# Patient Record
Sex: FEMALE | Race: WHITE | Employment: FULL TIME | URBAN - METROPOLITAN AREA
[De-identification: names, ages, dates, MRNs, and addresses within clinical notes are randomized per-mention and may not be internally consistent; named-entity substitution may affect disease eponyms.]

---

## 2017-04-25 ENCOUNTER — OFFICE VISIT (OUTPATIENT)
Dept: INTERNAL MEDICINE CLINIC | Facility: CLINIC | Age: 57
End: 2017-04-25

## 2017-04-25 VITALS
OXYGEN SATURATION: 99 % | RESPIRATION RATE: 16 BRPM | HEIGHT: 64.25 IN | SYSTOLIC BLOOD PRESSURE: 104 MMHG | WEIGHT: 146.63 LBS | DIASTOLIC BLOOD PRESSURE: 66 MMHG | TEMPERATURE: 98 F | HEART RATE: 68 BPM | BODY MASS INDEX: 25.03 KG/M2

## 2017-04-25 DIAGNOSIS — Z12.4 ENCOUNTER FOR SCREENING FOR CERVICAL CANCER: ICD-10-CM

## 2017-04-25 DIAGNOSIS — Z00.00 ROUTINE GENERAL MEDICAL EXAMINATION AT A HEALTH CARE FACILITY: Primary | ICD-10-CM

## 2017-04-25 DIAGNOSIS — Z12.31 ENCOUNTER FOR SCREENING MAMMOGRAM FOR BREAST CANCER: ICD-10-CM

## 2017-04-25 PROCEDURE — 99396 PREV VISIT EST AGE 40-64: CPT | Performed by: INTERNAL MEDICINE

## 2017-04-25 PROCEDURE — 88175 CYTOPATH C/V AUTO FLUID REDO: CPT | Performed by: INTERNAL MEDICINE

## 2017-04-25 PROCEDURE — 87624 HPV HI-RISK TYP POOLED RSLT: CPT | Performed by: INTERNAL MEDICINE

## 2017-04-25 NOTE — PROGRESS NOTES
HPI:   [facility] Record exertion, no RODRIGUEZ or SOB  GI: denies abdominal pain,denies heartburn, change in bm's, bloody stools,   : denies dysuria, vaginal discharge or itching,  MUSCULOSKELETAL: denies back pain, new joint pain  NEURO: denies headaches, dizziness  PSYCHE: denies d encounter diagnosis)  Encounter for screening mammogram for breast cancer      Orders Placed This Encounter  CBC With Differential With Platelet  CMP  Lipid Panel  TSH    There are no Patient Instructions on file for this visit.     Meds & Refills for this

## 2017-05-31 ENCOUNTER — LAB ENCOUNTER (OUTPATIENT)
Dept: LAB | Age: 57
End: 2017-05-31
Attending: INTERNAL MEDICINE
Payer: COMMERCIAL

## 2017-05-31 ENCOUNTER — HOSPITAL ENCOUNTER (OUTPATIENT)
Dept: MAMMOGRAPHY | Age: 57
Discharge: HOME | End: 2017-05-31
Attending: INTERNAL MEDICINE
Payer: COMMERCIAL

## 2017-05-31 DIAGNOSIS — Z12.31 ENCOUNTER FOR SCREENING MAMMOGRAM FOR BREAST CANCER: ICD-10-CM

## 2017-05-31 DIAGNOSIS — Z00.00 ROUTINE GENERAL MEDICAL EXAMINATION AT A HEALTH CARE FACILITY: ICD-10-CM

## 2017-05-31 PROCEDURE — 77067 SCR MAMMO BI INCL CAD: CPT | Performed by: INTERNAL MEDICINE

## 2017-05-31 PROCEDURE — 36415 COLL VENOUS BLD VENIPUNCTURE: CPT | Performed by: INTERNAL MEDICINE

## 2017-06-13 ENCOUNTER — HOSPITAL ENCOUNTER (OUTPATIENT)
Dept: MAMMOGRAPHY | Facility: HOSPITAL | Age: 57
Discharge: HOME | End: 2017-06-13
Attending: INTERNAL MEDICINE
Payer: COMMERCIAL

## 2017-06-13 DIAGNOSIS — R92.8 ABNORMAL MAMMOGRAM OF LEFT BREAST: ICD-10-CM

## 2017-06-13 PROCEDURE — 77065 DX MAMMO INCL CAD UNI: CPT | Performed by: INTERNAL MEDICINE

## 2017-06-13 NOTE — PROGRESS NOTES
Quick Note:    Spoke to pt, aware of results and recommendations. Pt agreeable.  Order placed.  ______

## 2017-11-16 PROBLEM — M20.21 ACQUIRED HALLUX RIGIDUS, RIGHT: Status: ACTIVE | Noted: 2017-11-16

## 2017-12-12 ENCOUNTER — ANESTHESIA (OUTPATIENT)
Dept: SURGERY | Facility: HOSPITAL | Age: 57
End: 2017-12-12
Payer: COMMERCIAL

## 2017-12-12 ENCOUNTER — APPOINTMENT (OUTPATIENT)
Dept: GENERAL RADIOLOGY | Facility: HOSPITAL | Age: 57
End: 2017-12-12
Attending: ORTHOPAEDIC SURGERY
Payer: COMMERCIAL

## 2017-12-12 ENCOUNTER — HOSPITAL ENCOUNTER (OUTPATIENT)
Facility: HOSPITAL | Age: 57
Setting detail: HOSPITAL OUTPATIENT SURGERY
Discharge: HOME | End: 2017-12-12
Attending: ORTHOPAEDIC SURGERY | Admitting: ORTHOPAEDIC SURGERY
Payer: COMMERCIAL

## 2017-12-12 ENCOUNTER — SURGERY (OUTPATIENT)
Age: 57
End: 2017-12-12

## 2017-12-12 ENCOUNTER — ANESTHESIA EVENT (OUTPATIENT)
Dept: SURGERY | Facility: HOSPITAL | Age: 57
End: 2017-12-12
Payer: COMMERCIAL

## 2017-12-12 VITALS
SYSTOLIC BLOOD PRESSURE: 129 MMHG | HEART RATE: 62 BPM | OXYGEN SATURATION: 100 % | WEIGHT: 143 LBS | HEIGHT: 64 IN | RESPIRATION RATE: 18 BRPM | TEMPERATURE: 98 F | DIASTOLIC BLOOD PRESSURE: 67 MMHG | BODY MASS INDEX: 24.41 KG/M2

## 2017-12-12 DIAGNOSIS — M20.21 ACQUIRED HALLUX RIGIDUS, RIGHT: Primary | ICD-10-CM

## 2017-12-12 PROCEDURE — 94010 BREATHING CAPACITY TEST: CPT | Performed by: ORTHOPAEDIC SURGERY

## 2017-12-12 PROCEDURE — 0SRM0JZ REPLACEMENT OF RIGHT METATARSAL-PHALANGEAL JOINT WITH SYNTHETIC SUBSTITUTE, OPEN APPROACH: ICD-10-PCS | Performed by: ORTHOPAEDIC SURGERY

## 2017-12-12 PROCEDURE — 0SNM0ZZ RELEASE RIGHT METATARSAL-PHALANGEAL JOINT, OPEN APPROACH: ICD-10-PCS | Performed by: ORTHOPAEDIC SURGERY

## 2017-12-12 PROCEDURE — 73620 X-RAY EXAM OF FOOT: CPT | Performed by: ORTHOPAEDIC SURGERY

## 2017-12-12 PROCEDURE — 76001 XR C-ARM FLUORO >1 HOUR  (CPT=76001): CPT | Performed by: ORTHOPAEDIC SURGERY

## 2017-12-12 RX ORDER — ONDANSETRON 2 MG/ML
4 INJECTION INTRAMUSCULAR; INTRAVENOUS EVERY 6 HOURS PRN
Status: DISCONTINUED | OUTPATIENT
Start: 2017-12-12 | End: 2017-12-12

## 2017-12-12 RX ORDER — HYDROMORPHONE HYDROCHLORIDE 1 MG/ML
0.2 INJECTION, SOLUTION INTRAMUSCULAR; INTRAVENOUS; SUBCUTANEOUS EVERY 5 MIN PRN
Status: DISCONTINUED | OUTPATIENT
Start: 2017-12-12 | End: 2017-12-12

## 2017-12-12 RX ORDER — HYDROCODONE BITARTRATE AND ACETAMINOPHEN 5; 325 MG/1; MG/1
1 TABLET ORAL AS NEEDED
Status: DISCONTINUED | OUTPATIENT
Start: 2017-12-12 | End: 2017-12-12

## 2017-12-12 RX ORDER — MORPHINE SULFATE 2 MG/ML
2 INJECTION, SOLUTION INTRAMUSCULAR; INTRAVENOUS EVERY 2 HOUR PRN
Status: DISCONTINUED | OUTPATIENT
Start: 2017-12-12 | End: 2017-12-12

## 2017-12-12 RX ORDER — KETOROLAC TROMETHAMINE 15 MG/ML
15 INJECTION, SOLUTION INTRAMUSCULAR; INTRAVENOUS EVERY 6 HOURS PRN
Status: DISCONTINUED | OUTPATIENT
Start: 2017-12-12 | End: 2017-12-12

## 2017-12-12 RX ORDER — ACETAMINOPHEN 500 MG
1000 TABLET ORAL ONCE
Status: COMPLETED | OUTPATIENT
Start: 2017-12-12 | End: 2017-12-12

## 2017-12-12 RX ORDER — CEFAZOLIN SODIUM/WATER 2 G/20 ML
2 SYRINGE (ML) INTRAVENOUS ONCE
Status: COMPLETED | OUTPATIENT
Start: 2017-12-12 | End: 2017-12-12

## 2017-12-12 RX ORDER — MORPHINE SULFATE 2 MG/ML
2 INJECTION, SOLUTION INTRAMUSCULAR; INTRAVENOUS EVERY 10 MIN PRN
Status: DISCONTINUED | OUTPATIENT
Start: 2017-12-12 | End: 2017-12-12

## 2017-12-12 RX ORDER — HYDROCODONE BITARTRATE AND ACETAMINOPHEN 10; 325 MG/1; MG/1
1 TABLET ORAL EVERY 4 HOURS PRN
Status: DISCONTINUED | OUTPATIENT
Start: 2017-12-12 | End: 2017-12-12

## 2017-12-12 RX ORDER — HYDROMORPHONE HYDROCHLORIDE 1 MG/ML
0.4 INJECTION, SOLUTION INTRAMUSCULAR; INTRAVENOUS; SUBCUTANEOUS EVERY 5 MIN PRN
Status: DISCONTINUED | OUTPATIENT
Start: 2017-12-12 | End: 2017-12-12

## 2017-12-12 RX ORDER — KETOROLAC TROMETHAMINE 30 MG/ML
30 INJECTION, SOLUTION INTRAMUSCULAR; INTRAVENOUS EVERY 6 HOURS PRN
Status: DISCONTINUED | OUTPATIENT
Start: 2017-12-12 | End: 2017-12-12

## 2017-12-12 RX ORDER — MORPHINE SULFATE 2 MG/ML
1 INJECTION, SOLUTION INTRAMUSCULAR; INTRAVENOUS EVERY 2 HOUR PRN
Status: DISCONTINUED | OUTPATIENT
Start: 2017-12-12 | End: 2017-12-12

## 2017-12-12 RX ORDER — LIDOCAINE HYDROCHLORIDE 10 MG/ML
INJECTION, SOLUTION EPIDURAL; INFILTRATION; INTRACAUDAL; PERINEURAL AS NEEDED
Status: DISCONTINUED | OUTPATIENT
Start: 2017-12-12 | End: 2017-12-12 | Stop reason: SURG

## 2017-12-12 RX ORDER — HYDROMORPHONE HYDROCHLORIDE 1 MG/ML
0.6 INJECTION, SOLUTION INTRAMUSCULAR; INTRAVENOUS; SUBCUTANEOUS EVERY 5 MIN PRN
Status: DISCONTINUED | OUTPATIENT
Start: 2017-12-12 | End: 2017-12-12

## 2017-12-12 RX ORDER — METOCLOPRAMIDE 10 MG/1
10 TABLET ORAL ONCE
Status: DISCONTINUED | OUTPATIENT
Start: 2017-12-12 | End: 2017-12-12 | Stop reason: HOSPADM

## 2017-12-12 RX ORDER — NALOXONE HYDROCHLORIDE 0.4 MG/ML
80 INJECTION, SOLUTION INTRAMUSCULAR; INTRAVENOUS; SUBCUTANEOUS AS NEEDED
Status: DISCONTINUED | OUTPATIENT
Start: 2017-12-12 | End: 2017-12-12

## 2017-12-12 RX ORDER — HALOPERIDOL 5 MG/ML
0.25 INJECTION INTRAMUSCULAR ONCE AS NEEDED
Status: DISCONTINUED | OUTPATIENT
Start: 2017-12-12 | End: 2017-12-12

## 2017-12-12 RX ORDER — BUPIVACAINE HYDROCHLORIDE 2.5 MG/ML
INJECTION, SOLUTION EPIDURAL; INFILTRATION; INTRACAUDAL AS NEEDED
Status: DISCONTINUED | OUTPATIENT
Start: 2017-12-12 | End: 2017-12-12

## 2017-12-12 RX ORDER — ONDANSETRON 2 MG/ML
4 INJECTION INTRAMUSCULAR; INTRAVENOUS ONCE AS NEEDED
Status: COMPLETED | OUTPATIENT
Start: 2017-12-12 | End: 2017-12-12

## 2017-12-12 RX ORDER — MORPHINE SULFATE 10 MG/ML
6 INJECTION, SOLUTION INTRAMUSCULAR; INTRAVENOUS EVERY 10 MIN PRN
Status: DISCONTINUED | OUTPATIENT
Start: 2017-12-12 | End: 2017-12-12

## 2017-12-12 RX ORDER — DEXAMETHASONE SODIUM PHOSPHATE 4 MG/ML
VIAL (ML) INJECTION AS NEEDED
Status: DISCONTINUED | OUTPATIENT
Start: 2017-12-12 | End: 2017-12-12 | Stop reason: SURG

## 2017-12-12 RX ORDER — FAMOTIDINE 20 MG/1
20 TABLET ORAL ONCE
Status: DISCONTINUED | OUTPATIENT
Start: 2017-12-12 | End: 2017-12-12 | Stop reason: HOSPADM

## 2017-12-12 RX ORDER — SODIUM CHLORIDE, SODIUM LACTATE, POTASSIUM CHLORIDE, CALCIUM CHLORIDE 600; 310; 30; 20 MG/100ML; MG/100ML; MG/100ML; MG/100ML
INJECTION, SOLUTION INTRAVENOUS CONTINUOUS
Status: DISCONTINUED | OUTPATIENT
Start: 2017-12-12 | End: 2017-12-12

## 2017-12-12 RX ORDER — MORPHINE SULFATE 4 MG/ML
4 INJECTION, SOLUTION INTRAMUSCULAR; INTRAVENOUS EVERY 10 MIN PRN
Status: DISCONTINUED | OUTPATIENT
Start: 2017-12-12 | End: 2017-12-12

## 2017-12-12 RX ORDER — ONDANSETRON 2 MG/ML
INJECTION INTRAMUSCULAR; INTRAVENOUS AS NEEDED
Status: DISCONTINUED | OUTPATIENT
Start: 2017-12-12 | End: 2017-12-12 | Stop reason: SURG

## 2017-12-12 RX ORDER — HYDROCODONE BITARTRATE AND ACETAMINOPHEN 5; 325 MG/1; MG/1
2 TABLET ORAL AS NEEDED
Status: DISCONTINUED | OUTPATIENT
Start: 2017-12-12 | End: 2017-12-12

## 2017-12-12 RX ORDER — MIDAZOLAM HYDROCHLORIDE 1 MG/ML
INJECTION INTRAMUSCULAR; INTRAVENOUS AS NEEDED
Status: DISCONTINUED | OUTPATIENT
Start: 2017-12-12 | End: 2017-12-12 | Stop reason: SURG

## 2017-12-12 RX ADMIN — LIDOCAINE HYDROCHLORIDE 50 MG: 10 INJECTION, SOLUTION EPIDURAL; INFILTRATION; INTRACAUDAL; PERINEURAL at 14:26:00

## 2017-12-12 RX ADMIN — CEFAZOLIN SODIUM/WATER 2 G: 2 G/20 ML SYRINGE (ML) INTRAVENOUS at 14:33:00

## 2017-12-12 RX ADMIN — ONDANSETRON 4 MG: 2 INJECTION INTRAMUSCULAR; INTRAVENOUS at 14:26:00

## 2017-12-12 RX ADMIN — DEXAMETHASONE SODIUM PHOSPHATE 4 MG: 4 MG/ML VIAL (ML) INJECTION at 14:26:00

## 2017-12-12 RX ADMIN — SODIUM CHLORIDE, SODIUM LACTATE, POTASSIUM CHLORIDE, CALCIUM CHLORIDE: 600; 310; 30; 20 INJECTION, SOLUTION INTRAVENOUS at 14:55:00

## 2017-12-12 RX ADMIN — MIDAZOLAM HYDROCHLORIDE 2 MG: 1 INJECTION INTRAMUSCULAR; INTRAVENOUS at 14:24:00

## 2017-12-12 NOTE — H&P
History & Physical Examination    Patient Name: Jn Mc  MRN: [id number]  CSN: [id number]  YOB: 1960    Diagnosis: right hallux rigidus    Present Illness: 60 y/o with chronic right great toe pain. Failed conservative care.       Pre (COMPAZINE) injection 5 mg 5 mg Intravenous Once PRN   haloperidol lactate (HALDOL) 5 MG/ML injection 0.25 mg 0.25 mg Intravenous Once PRN   Naloxone HCl (NARCAN) 0.4 MG/ML injection 80 mcg 80 mcg Intravenous PRN   HYDROcodone-acetaminophen (Dorothey Macie) 10325 and Other Disorders Associated Father    • Breast Cancer Paternal Cousin Female 54   • Thyroid Disorder Sister         Smoking status: Never Smoker    Smokeless tobacco: Never Used    Alcohol use Yes  6.0 - 8.4 oz/week    10 - 14 Glasses of wine per week

## 2017-12-12 NOTE — ANESTHESIA POSTPROCEDURE EVALUATION
Patient: Perla Mendoza    Procedure Summary     Date:  12/12/17 Room / Location:  [facility] OR  / [facility] OR    Anesthesia Start:  1418 Anesthesia Stop:      Procedure:  FOOT OSTEOTOMY (Right Foot) Diagnosis:  (right hallux rigidus)    Surgeon:  Stephane Henry

## 2017-12-12 NOTE — ANESTHESIA PREPROCEDURE EVALUATION
Anesthesia PreOp Note    HPI:     Dionisio Gongora is a 59year old female who presents for preoperative consultation requested by: Vanessa Garcia MD    Date of Surgery: 12/12/2017    Procedure(s):  FOOT OSTEOTOMY  Indication: right hallux rigidus (TRANSDERM-SCOP, 1.5 MG,) 1 MG/3DAYS Transdermal Patch 72 Hr Place 1 patch onto the skin every third day.  Disp: 1 patch Rfl: 0 12/12/2017 at 0900   HYDROcodone-acetaminophen (NORCO)  MG Oral Tab Take 1-2 tablets by mouth every 6 (six) hours as needed 12/12/17  1232   BP:  134/72   BP Location:  Right arm   Pulse:  71   Resp:  16   Temp:  (!) 97.4 °F (36.3 °C)   TempSrc:  Oral   SpO2:  96%   Weight: 65.8 kg (145 lb) 64.9 kg (143 lb)   Height: 1.626 m (5' 4\")         Anesthesia ROS/Med Hx and Physical E

## 2017-12-13 NOTE — OPERATIVE REPORT
[facility]    PATIENT'S NAME: Luke Yaquelinjay   ATTENDING PHYSICIAN: Aracelis Vo MD   OPERATING PHYSICIAN: Aracelis Vo MD   PATIENT ACCOUNT#:   [id number]    LOCATION:  [address]  MEDICAL RECORD #:   [id number]       DATE OF We removed the prominent dorsal osteophyte of the first MTP joint. There was loss of articular cartilage from the first metatarsal head. We placed a guidewire until it was centered on AP and lateral views.   We then sized 10 mm as appropriate size implant

## 2018-05-29 PROBLEM — S42.452A CLOSED FRACTURE OF CAPITELLUM OF DISTAL HUMERUS, LEFT, INITIAL ENCOUNTER: Status: ACTIVE | Noted: 2018-05-29

## 2018-05-30 PROBLEM — M25.522 LEFT ELBOW PAIN: Status: ACTIVE | Noted: 2018-05-30

## 2018-05-30 PROBLEM — S42.452D: Status: ACTIVE | Noted: 2018-05-30

## 2018-07-19 PROBLEM — M20.21 ACQUIRED HALLUX RIGIDUS OF RIGHT FOOT: Status: ACTIVE | Noted: 2018-07-19

## 2018-07-23 ENCOUNTER — OFFICE VISIT (OUTPATIENT)
Dept: INTERNAL MEDICINE CLINIC | Facility: CLINIC | Age: 58
End: 2018-07-23
Payer: COMMERCIAL

## 2018-07-23 VITALS
SYSTOLIC BLOOD PRESSURE: 114 MMHG | HEIGHT: 63.5 IN | BODY MASS INDEX: 25.55 KG/M2 | WEIGHT: 146 LBS | DIASTOLIC BLOOD PRESSURE: 78 MMHG | OXYGEN SATURATION: 99 % | RESPIRATION RATE: 14 BRPM | HEART RATE: 61 BPM | TEMPERATURE: 98 F

## 2018-07-23 DIAGNOSIS — Z00.00 ROUTINE GENERAL MEDICAL EXAMINATION AT A HEALTH CARE FACILITY: ICD-10-CM

## 2018-07-23 DIAGNOSIS — R42 DIZZINESS: ICD-10-CM

## 2018-07-23 DIAGNOSIS — I34.1 MVP (MITRAL VALVE PROLAPSE): ICD-10-CM

## 2018-07-23 DIAGNOSIS — Z12.31 ENCOUNTER FOR SCREENING MAMMOGRAM FOR BREAST CANCER: Primary | ICD-10-CM

## 2018-07-23 PROCEDURE — 99396 PREV VISIT EST AGE 40-64: CPT | Performed by: INTERNAL MEDICINE

## 2018-07-23 RX ORDER — RUFINAMIDE 40 MG/ML
1 SUSPENSION ORAL DAILY
COMMUNITY

## 2018-07-23 NOTE — PROGRESS NOTES
HPI:   Wylie Riedel car fax. : in a committed relationship for 8 years . but lives separately Children: 2 adult, LMP:fenubia, 2013 , Exercise walks her dogs a lot     REVIEW OF SYSTEMS:   GENERAL: feels well otherwise  SKIN: denies any unusual skin lesions  EYES:denies tenderness  :introitus is normal,scant discharge,cervix is pink,no adnexal masses or tenderness,  RECTAL:good rectal tone, stool is OB negative no rectal masses  MUSCULOSKELETAL: back is not tender,FROM of the back  EXTREMITIES: no cyanosis, clubbing or

## 2018-07-26 ENCOUNTER — PATIENT MESSAGE (OUTPATIENT)
Dept: INTERNAL MEDICINE CLINIC | Facility: CLINIC | Age: 58
End: 2018-07-26

## 2018-07-26 NOTE — TELEPHONE ENCOUNTER
From: Crystal Mc  To: Diana Castillo MD  Sent: 7/26/2018 1:56 PM CDT  Subject: Non-Urgent Medical Question    I haven’t seen any orders to get my annual checkup follow up tests- i.e., mammogram, ekg, blood work.  Is it too soon or is there s

## 2018-08-13 ENCOUNTER — HOSPITAL ENCOUNTER (OUTPATIENT)
Dept: MAMMOGRAPHY | Age: 58
Discharge: HOME | End: 2018-08-13
Attending: INTERNAL MEDICINE
Payer: COMMERCIAL

## 2018-08-13 DIAGNOSIS — Z12.31 ENCOUNTER FOR SCREENING MAMMOGRAM FOR BREAST CANCER: ICD-10-CM

## 2018-08-13 PROCEDURE — 77067 SCR MAMMO BI INCL CAD: CPT | Performed by: INTERNAL MEDICINE

## 2018-08-13 PROCEDURE — 77063 BREAST TOMOSYNTHESIS BI: CPT | Performed by: INTERNAL MEDICINE

## 2018-08-23 ENCOUNTER — HOSPITAL ENCOUNTER (OUTPATIENT)
Dept: CV DIAGNOSTICS | Facility: HOSPITAL | Age: 58
Discharge: HOME | End: 2018-08-23
Attending: INTERNAL MEDICINE
Payer: COMMERCIAL

## 2018-08-23 ENCOUNTER — LAB ENCOUNTER (OUTPATIENT)
Dept: LAB | Age: 58
End: 2018-08-23
Attending: INTERNAL MEDICINE
Payer: COMMERCIAL

## 2018-08-23 DIAGNOSIS — Z00.00 ROUTINE GENERAL MEDICAL EXAMINATION AT A HEALTH CARE FACILITY: ICD-10-CM

## 2018-08-23 DIAGNOSIS — I34.1 MVP (MITRAL VALVE PROLAPSE): ICD-10-CM

## 2018-08-23 DIAGNOSIS — R42 DIZZINESS: ICD-10-CM

## 2018-08-23 LAB
ALBUMIN SERPL-MCNC: 3.9 G/DL (ref 3.5–4.8)
ALBUMIN/GLOB SERPL: 1.2 {RATIO} (ref 1–2)
ALP LIVER SERPL-CCNC: 53 U/L (ref 46–118)
ALT SERPL-CCNC: 26 U/L (ref 14–54)
ANION GAP SERPL CALC-SCNC: 6 MMOL/L (ref 0–18)
AST SERPL-CCNC: 15 U/L (ref 15–41)
BASOPHILS # BLD AUTO: 0.03 X10(3) UL (ref 0–0.1)
BASOPHILS NFR BLD AUTO: 0.5 %
BILIRUB SERPL-MCNC: 0.6 MG/DL (ref 0.1–2)
BUN BLD-MCNC: 12 MG/DL (ref 8–20)
BUN/CREAT SERPL: 18.2 (ref 10–20)
CALCIUM BLD-MCNC: 9.4 MG/DL (ref 8.3–10.3)
CHLORIDE SERPL-SCNC: 107 MMOL/L (ref 101–111)
CHOLEST SMN-MCNC: 182 MG/DL (ref ?–200)
CO2 SERPL-SCNC: 28 MMOL/L (ref 22–32)
CREAT BLD-MCNC: 0.66 MG/DL (ref 0.55–1.02)
EOSINOPHIL # BLD AUTO: 0.25 X10(3) UL (ref 0–0.3)
EOSINOPHIL NFR BLD AUTO: 4 %
ERYTHROCYTE [DISTWIDTH] IN BLOOD BY AUTOMATED COUNT: 13 % (ref 11.5–16)
GLOBULIN PLAS-MCNC: 3.3 G/DL (ref 2.5–4)
GLUCOSE BLD-MCNC: 78 MG/DL (ref 70–99)
HCT VFR BLD AUTO: 40.5 % (ref 34–50)
HDLC SERPL-MCNC: 69 MG/DL (ref 40–59)
HGB BLD-MCNC: 12.9 G/DL (ref 12–16)
IMMATURE GRANULOCYTE COUNT: 0.01 X10(3) UL (ref 0–1)
IMMATURE GRANULOCYTE RATIO %: 0.2 %
LDLC SERPL CALC-MCNC: 102 MG/DL (ref ?–100)
LYMPHOCYTES # BLD AUTO: 1.93 X10(3) UL (ref 0.9–4)
LYMPHOCYTES NFR BLD AUTO: 31.2 %
M PROTEIN MFR SERPL ELPH: 7.2 G/DL (ref 6.1–8.3)
MCH RBC QN AUTO: 30.6 PG (ref 27–33.2)
MCHC RBC AUTO-ENTMCNC: 31.9 G/DL (ref 31–37)
MCV RBC AUTO: 96 FL (ref 81–100)
MONOCYTES # BLD AUTO: 0.56 X10(3) UL (ref 0.1–1)
MONOCYTES NFR BLD AUTO: 9 %
NEUTROPHIL ABS PRELIM: 3.41 X10 (3) UL (ref 1.3–6.7)
NEUTROPHILS # BLD AUTO: 3.41 X10(3) UL (ref 1.3–6.7)
NEUTROPHILS NFR BLD AUTO: 55.1 %
NONHDLC SERPL-MCNC: 113 MG/DL (ref ?–130)
OSMOLALITY SERPL CALC.SUM OF ELEC: 291 MOSM/KG (ref 275–295)
PLATELET # BLD AUTO: 224 10(3)UL (ref 150–450)
POTASSIUM SERPL-SCNC: 4.1 MMOL/L (ref 3.6–5.1)
RBC # BLD AUTO: 4.22 X10(6)UL (ref 3.8–5.1)
RED CELL DISTRIBUTION WIDTH-SD: 45.5 FL (ref 35.1–46.3)
SODIUM SERPL-SCNC: 141 MMOL/L (ref 136–144)
TRIGL SERPL-MCNC: 53 MG/DL (ref 30–149)
TSI SER-ACNC: 3.15 MIU/ML (ref 0.35–5.5)
VLDLC SERPL CALC-MCNC: 11 MG/DL (ref 0–30)
WBC # BLD AUTO: 6.2 X10(3) UL (ref 4–13)

## 2018-08-23 PROCEDURE — 84443 ASSAY THYROID STIM HORMONE: CPT

## 2018-08-23 PROCEDURE — 80061 LIPID PANEL: CPT

## 2018-08-23 PROCEDURE — 36415 COLL VENOUS BLD VENIPUNCTURE: CPT

## 2018-08-23 PROCEDURE — 85025 COMPLETE CBC W/AUTO DIFF WBC: CPT

## 2018-08-23 PROCEDURE — 93306 TTE W/DOPPLER COMPLETE: CPT | Performed by: INTERNAL MEDICINE

## 2018-08-23 PROCEDURE — 80053 COMPREHEN METABOLIC PANEL: CPT

## 2018-12-27 ENCOUNTER — OFFICE VISIT (OUTPATIENT)
Dept: FAMILY MEDICINE CLINIC | Facility: CLINIC | Age: 58
End: 2018-12-27
Payer: COMMERCIAL

## 2018-12-27 VITALS
HEART RATE: 84 BPM | BODY MASS INDEX: 24.75 KG/M2 | WEIGHT: 145 LBS | TEMPERATURE: 97 F | HEIGHT: 64 IN | OXYGEN SATURATION: 98 % | DIASTOLIC BLOOD PRESSURE: 66 MMHG | SYSTOLIC BLOOD PRESSURE: 112 MMHG

## 2018-12-27 DIAGNOSIS — J06.9 UPPER RESPIRATORY TRACT INFECTION, UNSPECIFIED TYPE: Primary | ICD-10-CM

## 2018-12-27 DIAGNOSIS — R05.9 COUGH: ICD-10-CM

## 2018-12-27 PROCEDURE — 99213 OFFICE O/P EST LOW 20 MIN: CPT | Performed by: PHYSICIAN ASSISTANT

## 2018-12-27 RX ORDER — BENZONATATE 200 MG/1
200 CAPSULE ORAL 3 TIMES DAILY PRN
Qty: 21 CAPSULE | Refills: 0 | Status: SHIPPED | OUTPATIENT
Start: 2018-12-27 | End: 2019-01-03

## 2018-12-27 RX ORDER — METHYLPREDNISOLONE 4 MG/1
TABLET ORAL
Qty: 1 PACKAGE | Refills: 0 | Status: SHIPPED | OUTPATIENT
Start: 2018-12-27 | End: 2019-06-24

## 2018-12-27 NOTE — PROGRESS NOTES
CHIEF COMPLAINT:   Patient presents with:  Cough: cough is with phlegm and dry, keeping her up at night per pt, drainage, headache off and on, ear discomfort x 1 wk tried OTC and not helping       HPI:   Trevor Marcial is a 59year old female who presen 8/01   • Varicose veins 4/06   • Viral bronchitis 4/07      Past Surgical History:   Procedure Laterality Date   • CHOLECYSTECTOMY     • COLONOSCOPY  04/08/2015   • COLONOSCOPY     • COLPOSCOPY, CERVIX INC UPPER/ADJACENT VAGINA      LEEP,cryo   • FOOT OSTE mucosa pink, moist. Posterior pharynx is not erythematous. No exudates. Uvula midline. NECK: Supple, non-tender  LUNGS: clear to auscultation bilaterally, no wheezes or rhonchi. Breathing is non labored.   CARDIO: RRR without murmur  EXTREMITIES: no cyano

## 2018-12-27 NOTE — PATIENT INSTRUCTIONS
Patient Declined AVS    Verbal Instructions given      1. Medrol- No NSAIDs  2. Tessalon  3. OTC antihistamine  4. Increase fluids/ rest  5. Follow up with PCP  6.  Seek treatment if worsening symptoms

## 2019-04-24 ENCOUNTER — TELEPHONE (OUTPATIENT)
Dept: INTERNAL MEDICINE CLINIC | Facility: CLINIC | Age: 59
End: 2019-04-24

## 2019-04-24 NOTE — TELEPHONE ENCOUNTER
2nd shinrix received at [facility]. Immunization record updated.

## 2019-11-13 ENCOUNTER — TELEPHONE (OUTPATIENT)
Dept: INTERNAL MEDICINE CLINIC | Facility: CLINIC | Age: 59
End: 2019-11-13

## 2019-11-13 DIAGNOSIS — Z12.31 ENCOUNTER FOR SCREENING MAMMOGRAM FOR BREAST CANCER: Primary | ICD-10-CM

## 2019-11-13 NOTE — TELEPHONE ENCOUNTER
Patient scheduled her physical for 12/30/19. She is wondering if she can have an order for the mammogram prior to her appointment since the appointment is so late in the year. Please advise. Thank you!

## 2019-11-14 NOTE — TELEPHONE ENCOUNTER
PE 12/30/19. Pt requesting mammo order to complete before OV.   Bonita Cespedes pending for approval

## 2019-11-14 NOTE — TELEPHONE ENCOUNTER
Spoke to pt. Made aware of mammo order. Provided Central Scheduling number. Pt voiced understanding.

## 2019-12-06 ENCOUNTER — HOSPITAL ENCOUNTER (OUTPATIENT)
Dept: MAMMOGRAPHY | Age: 59
Discharge: HOME | End: 2019-12-06
Attending: INTERNAL MEDICINE
Payer: COMMERCIAL

## 2019-12-06 DIAGNOSIS — Z12.31 ENCOUNTER FOR SCREENING MAMMOGRAM FOR BREAST CANCER: ICD-10-CM

## 2019-12-06 PROCEDURE — 77067 SCR MAMMO BI INCL CAD: CPT | Performed by: INTERNAL MEDICINE

## 2019-12-06 PROCEDURE — 77063 BREAST TOMOSYNTHESIS BI: CPT | Performed by: INTERNAL MEDICINE

## 2019-12-13 ENCOUNTER — HOSPITAL ENCOUNTER (OUTPATIENT)
Age: 59
Discharge: HOME | End: 2019-12-13
Attending: FAMILY MEDICINE
Payer: COMMERCIAL

## 2019-12-13 VITALS
SYSTOLIC BLOOD PRESSURE: 138 MMHG | OXYGEN SATURATION: 97 % | DIASTOLIC BLOOD PRESSURE: 75 MMHG | RESPIRATION RATE: 16 BRPM | HEART RATE: 80 BPM | TEMPERATURE: 98 F | WEIGHT: 145 LBS | HEIGHT: 64 IN | BODY MASS INDEX: 24.75 KG/M2

## 2019-12-13 DIAGNOSIS — J06.9 VIRAL URI WITH COUGH: Primary | ICD-10-CM

## 2019-12-13 PROCEDURE — 99204 OFFICE O/P NEW MOD 45 MIN: CPT

## 2019-12-13 PROCEDURE — 99213 OFFICE O/P EST LOW 20 MIN: CPT

## 2019-12-13 RX ORDER — AZELASTINE 1 MG/ML
2 SPRAY, METERED NASAL 2 TIMES DAILY
Qty: 1 BOTTLE | Refills: 0 | Status: SHIPPED | OUTPATIENT
Start: 2019-12-13 | End: 2019-12-30 | Stop reason: ALTCHOICE

## 2019-12-13 RX ORDER — PREDNISONE 20 MG/1
40 TABLET ORAL DAILY
Qty: 10 TABLET | Refills: 0 | Status: SHIPPED | OUTPATIENT
Start: 2019-12-13 | End: 2019-12-18

## 2019-12-13 RX ORDER — CODEINE PHOSPHATE AND GUAIFENESIN 10; 100 MG/5ML; MG/5ML
5 SOLUTION ORAL NIGHTLY PRN
Qty: 50 ML | Refills: 0 | Status: SHIPPED | OUTPATIENT
Start: 2019-12-13 | End: 2019-12-20

## 2019-12-13 RX ORDER — BENZONATATE 100 MG/1
100 CAPSULE ORAL 3 TIMES DAILY PRN
Qty: 30 CAPSULE | Refills: 0 | Status: SHIPPED | OUTPATIENT
Start: 2019-12-13 | End: 2019-12-30 | Stop reason: ALTCHOICE

## 2019-12-13 NOTE — ED INITIAL ASSESSMENT (HPI)
Pt c/o sinus congestion for the past week. Pt states that she has lost her voice due to the congestion. Pt was just traveling from [place]. Pt c/o cough with mucous production.

## 2019-12-13 NOTE — ED PROVIDER NOTES
Patient Seen in: [facility]      History   Patient presents with:  Sinus Problem    Stated Complaint: losing voice, congestion, x5days     HPI    64-year-old female with a history of acute bronchitis and mitral valve prolap LEEP,cryo   • FOOT OSTEOTOMY Right 2017    Performed by Sandra Doty MD at [address] [place] [address] Dr   • FOOT SURGERY Left     artificial joint placement   • LAPAROSCOPIC CHOLECYSTECTOMY  05   •       x2   • OPEN REDUCTION INTERNAL FIXATION O Warm and dry  Neuro: A&O x3. No focal deficits  Psych: Normal affect      ED Course   Labs Reviewed - No data to display               MDM     58-year-old female with 5 days of URI-like symptoms. Symptoms most likely viral in nature.   Will start patient o

## 2019-12-20 ENCOUNTER — HOSPITAL ENCOUNTER (OUTPATIENT)
Age: 59
Discharge: HOME | End: 2019-12-20
Attending: FAMILY MEDICINE
Payer: COMMERCIAL

## 2019-12-20 VITALS
SYSTOLIC BLOOD PRESSURE: 124 MMHG | DIASTOLIC BLOOD PRESSURE: 74 MMHG | BODY MASS INDEX: 24.75 KG/M2 | RESPIRATION RATE: 16 BRPM | HEIGHT: 64 IN | WEIGHT: 145 LBS | OXYGEN SATURATION: 98 % | HEART RATE: 98 BPM

## 2019-12-20 DIAGNOSIS — J01.40 ACUTE NON-RECURRENT PANSINUSITIS: Primary | ICD-10-CM

## 2019-12-20 PROCEDURE — 99214 OFFICE O/P EST MOD 30 MIN: CPT

## 2019-12-20 PROCEDURE — 99213 OFFICE O/P EST LOW 20 MIN: CPT

## 2019-12-20 RX ORDER — DOXYCYCLINE HYCLATE 100 MG/1
100 CAPSULE ORAL 2 TIMES DAILY
Qty: 14 CAPSULE | Refills: 0 | Status: SHIPPED | OUTPATIENT
Start: 2019-12-20 | End: 2019-12-27

## 2019-12-20 RX ORDER — CODEINE PHOSPHATE AND GUAIFENESIN 10; 100 MG/5ML; MG/5ML
5 SOLUTION ORAL NIGHTLY PRN
Qty: 50 ML | Refills: 0 | Status: SHIPPED | OUTPATIENT
Start: 2019-12-20 | End: 2019-12-30 | Stop reason: ALTCHOICE

## 2019-12-20 NOTE — ED PROVIDER NOTES
Patient Seen in: [facility]      History   Patient presents with:  Cough/URI    Stated Complaint: congestion / ear ache / chronic cough x13 days    HPI    58-year-old female returns the IC secondary to her symptoms not impr Performed by Dottie Lambert MD at [facility] OR   • FOOT SURGERY Left     artificial joint placement   • LAPAROSCOPIC CHOLECYSTECTOMY  05   •       x2   • OPEN REDUCTION INTERNAL FIXATION OF CORONOID (ELBOW) Left 2018    Performed by Alfred deficits  Psych: Normal affect      ED Course   Labs Reviewed - No data to display               MDM     57-year-old female with 13 days of sinus pain and pressure, ear issues, and cough.   Patient was treated with conservative/symptomatic treatment with a

## 2019-12-30 ENCOUNTER — OFFICE VISIT (OUTPATIENT)
Dept: INTERNAL MEDICINE CLINIC | Facility: CLINIC | Age: 59
End: 2019-12-30
Payer: COMMERCIAL

## 2019-12-30 VITALS
SYSTOLIC BLOOD PRESSURE: 122 MMHG | WEIGHT: 145.31 LBS | BODY MASS INDEX: 24.81 KG/M2 | DIASTOLIC BLOOD PRESSURE: 70 MMHG | HEIGHT: 64 IN | RESPIRATION RATE: 14 BRPM | OXYGEN SATURATION: 98 % | HEART RATE: 79 BPM

## 2019-12-30 DIAGNOSIS — Z12.31 BREAST CANCER SCREENING BY MAMMOGRAM: ICD-10-CM

## 2019-12-30 DIAGNOSIS — Z00.00 ROUTINE GENERAL MEDICAL EXAMINATION AT A HEALTH CARE FACILITY: Primary | ICD-10-CM

## 2019-12-30 PROBLEM — M25.522 LEFT ELBOW PAIN: Status: RESOLVED | Noted: 2018-05-30 | Resolved: 2019-12-30

## 2019-12-30 PROBLEM — S42.452D: Status: RESOLVED | Noted: 2018-05-30 | Resolved: 2019-12-30

## 2019-12-30 PROBLEM — S42.452A CLOSED FRACTURE OF CAPITELLUM OF DISTAL HUMERUS, LEFT, INITIAL ENCOUNTER: Status: RESOLVED | Noted: 2018-05-29 | Resolved: 2019-12-30

## 2019-12-30 PROBLEM — M20.21 ACQUIRED HALLUX RIGIDUS, RIGHT: Status: RESOLVED | Noted: 2017-11-16 | Resolved: 2019-12-30

## 2019-12-30 PROCEDURE — 99396 PREV VISIT EST AGE 40-64: CPT | Performed by: INTERNAL MEDICINE

## 2019-12-30 NOTE — PROGRESS NOTES
HPI:   Vandana Card vision  HEENT: denies nasal congestion, sinus pain or ST  LUNGS: denies shortness of breath with exertion  CARDIOVASCULAR: denies chest pain on exertion, no RODRIGUEZ or SOB.  She has MVP w/mild MR,  GI denies heartburn, change in bm's, bloody stools,   : denie understanding of these issues and agrees to the plan. The patient is asked to return for CPX in 1 year.     Encounter for screening mammogram for breast cancer  (primary encounter diagnosis)  Routine general medical examination at a health care facility

## 2021-03-22 ENCOUNTER — OFFICE VISIT (OUTPATIENT)
Dept: INTERNAL MEDICINE CLINIC | Facility: CLINIC | Age: 61
End: 2021-03-22
Payer: COMMERCIAL

## 2021-03-22 VITALS
HEIGHT: 63.94 IN | SYSTOLIC BLOOD PRESSURE: 124 MMHG | DIASTOLIC BLOOD PRESSURE: 82 MMHG | RESPIRATION RATE: 16 BRPM | WEIGHT: 141 LBS | OXYGEN SATURATION: 97 % | HEART RATE: 70 BPM | TEMPERATURE: 98 F | BODY MASS INDEX: 24.37 KG/M2

## 2021-03-22 DIAGNOSIS — Z12.31 BREAST CANCER SCREENING BY MAMMOGRAM: ICD-10-CM

## 2021-03-22 DIAGNOSIS — Z00.00 ROUTINE GENERAL MEDICAL EXAMINATION AT A HEALTH CARE FACILITY: Primary | ICD-10-CM

## 2021-03-22 DIAGNOSIS — I34.1 MVP (MITRAL VALVE PROLAPSE): ICD-10-CM

## 2021-03-22 PROBLEM — Z98.890 HISTORY OF LOOP ELECTRICAL EXCISION PROCEDURE (LEEP): Status: ACTIVE | Noted: 2021-03-22

## 2021-03-22 PROCEDURE — 3074F SYST BP LT 130 MM HG: CPT | Performed by: INTERNAL MEDICINE

## 2021-03-22 PROCEDURE — 3008F BODY MASS INDEX DOCD: CPT | Performed by: INTERNAL MEDICINE

## 2021-03-22 PROCEDURE — 99396 PREV VISIT EST AGE 40-64: CPT | Performed by: INTERNAL MEDICINE

## 2021-03-22 PROCEDURE — 3079F DIAST BP 80-89 MM HG: CPT | Performed by: INTERNAL MEDICINE

## 2021-03-22 NOTE — PROGRESS NOTES
HPI:   Cristy Benavidez works out since being home     REVIEW OF SYSTEMS:   GENERAL: feels well otherwise  ROS:  CONSTITUTIONAL: no night sweats, fevers, unexplained wt loss, r  SKIN no rashes, suspicious or changing lesions  EYESdenies visual change or disturbance, drainage or i without murmur or gallop, loud midsystolic click  GI: good BS's,no masses, HSM or tenderness  MUSCULOSKELETAL: back is not tender,FROM of the back.    EXTREMITIES: no cyanosis, clubbing or edema, no varicosities or stasis change  NEURO: Oriented times three

## 2021-03-31 ENCOUNTER — TELEPHONE (OUTPATIENT)
Dept: INTERNAL MEDICINE CLINIC | Facility: CLINIC | Age: 61
End: 2021-03-31

## 2021-03-31 DIAGNOSIS — Z00.00 ROUTINE GENERAL MEDICAL EXAMINATION AT A HEALTH CARE FACILITY: Primary | ICD-10-CM

## 2021-03-31 NOTE — TELEPHONE ENCOUNTER
Incoming (mail or fax):  Fax  Received from:  [employer]  Documentation given to:  Results [employer]

## 2021-03-31 NOTE — TELEPHONE ENCOUNTER
Received CBC, CMP, FLP, & TSH W/ REFLEX results. Updated Ext Result Console as able. To Dr. Nir Olvera for review.

## 2021-03-31 NOTE — ADDENDUM NOTE
[facility]: 3/31/2021 08:56 AM     Modules accepted: Orders

## 2021-04-01 ENCOUNTER — HOSPITAL ENCOUNTER (OUTPATIENT)
Dept: MAMMOGRAPHY | Age: 61
Discharge: HOME | End: 2021-04-01
Attending: INTERNAL MEDICINE
Payer: COMMERCIAL

## 2021-04-01 DIAGNOSIS — Z12.31 BREAST CANCER SCREENING BY MAMMOGRAM: ICD-10-CM

## 2021-04-01 PROCEDURE — 77063 BREAST TOMOSYNTHESIS BI: CPT | Performed by: INTERNAL MEDICINE

## 2021-04-01 PROCEDURE — 77067 SCR MAMMO BI INCL CAD: CPT | Performed by: INTERNAL MEDICINE

## 2021-04-01 NOTE — TELEPHONE ENCOUNTER
My chart message was sent to patient to repeat labs in 3 months and also advised to hold multivitamins that may contain biotin for 3-4 days prior. Orders were placed and copy is mailed to patient.     TAYLOR Tarango

## 2021-04-13 ENCOUNTER — MED REC SCAN ONLY (OUTPATIENT)
Dept: INTERNAL MEDICINE CLINIC | Facility: CLINIC | Age: 61
End: 2021-04-13

## 2021-07-16 ENCOUNTER — LAB ENCOUNTER (OUTPATIENT)
Dept: LAB | Age: 61
End: 2021-07-16
Attending: INTERNAL MEDICINE
Payer: COMMERCIAL

## 2021-07-16 DIAGNOSIS — Z00.00 ROUTINE GENERAL MEDICAL EXAMINATION AT A HEALTH CARE FACILITY: ICD-10-CM

## 2021-07-16 LAB — TSI SER-ACNC: 2.65 MIU/ML (ref 0.36–3.74)

## 2021-07-16 PROCEDURE — 84443 ASSAY THYROID STIM HORMONE: CPT

## 2021-07-16 PROCEDURE — 36415 COLL VENOUS BLD VENIPUNCTURE: CPT

## 2021-08-18 ENCOUNTER — HOSPITAL ENCOUNTER (OUTPATIENT)
Age: 61
Discharge: HOME | End: 2021-08-18
Payer: COMMERCIAL

## 2021-08-18 VITALS
RESPIRATION RATE: 16 BRPM | WEIGHT: 140 LBS | TEMPERATURE: 97 F | OXYGEN SATURATION: 98 % | SYSTOLIC BLOOD PRESSURE: 131 MMHG | HEIGHT: 64 IN | DIASTOLIC BLOOD PRESSURE: 92 MMHG | BODY MASS INDEX: 23.9 KG/M2 | HEART RATE: 76 BPM

## 2021-08-18 DIAGNOSIS — J06.9 VIRAL UPPER RESPIRATORY ILLNESS: Primary | ICD-10-CM

## 2021-08-18 DIAGNOSIS — J04.0 ACUTE LARYNGITIS: ICD-10-CM

## 2021-08-18 PROCEDURE — 99213 OFFICE O/P EST LOW 20 MIN: CPT | Performed by: PHYSICIAN ASSISTANT

## 2021-08-18 RX ORDER — CODEINE PHOSPHATE AND GUAIFENESIN 10; 100 MG/5ML; MG/5ML
5 SOLUTION ORAL EVERY 6 HOURS PRN
Qty: 118 ML | Refills: 0 | Status: SHIPPED | OUTPATIENT
Start: 2021-08-18

## 2021-08-18 RX ORDER — PREDNISONE 20 MG/1
40 TABLET ORAL DAILY
Qty: 10 TABLET | Refills: 0 | Status: SHIPPED | OUTPATIENT
Start: 2021-08-18 | End: 2021-08-23

## 2021-08-18 NOTE — ED PROVIDER NOTES
Patient Seen in: [facility] [address] [facility]      History   No chief complaint on file. Stated Complaint: Loss of Voice; Sore Throat    HPI/Subjective:   HPI    Very pleasant 58-year-old female.   She arrives to the immediate care for further evalu May 2018    Broken elbow repair   • REPAIR ROTATOR CUFF,ACUTE  3/03    right subacromial decompression   • TONSILLECTOMY     • UPPER ARM/ELBOW SURGERY UNLISTED      ORIF left elbow to repair rx of radial head                Social History    Tobacco Use with honey. Voice rest.    Push clear fluids. Vitamin C and zinc.  Tea with honey. Cheratussin is a potent cough suppressant which contains a narcotic coating. Do not drive on this medication.   Do not take additional Mucinex when taking this cough syru

## 2021-08-18 NOTE — ED INITIAL ASSESSMENT (HPI)
Cold like symptoms x 1 month. C/o post nasal drip, headache, chest congestion and cough with loss of voice. Denies fevers. Covid vaccinated. Tested negative for covid 7/27/21.

## 2022-07-08 ENCOUNTER — OFFICE VISIT (OUTPATIENT)
Dept: INTERNAL MEDICINE CLINIC | Facility: CLINIC | Age: 62
End: 2022-07-08
Payer: COMMERCIAL

## 2022-07-08 VITALS
TEMPERATURE: 97 F | RESPIRATION RATE: 16 BRPM | HEART RATE: 72 BPM | BODY MASS INDEX: 24.29 KG/M2 | WEIGHT: 144 LBS | HEIGHT: 64.5 IN | OXYGEN SATURATION: 98 % | DIASTOLIC BLOOD PRESSURE: 72 MMHG | SYSTOLIC BLOOD PRESSURE: 118 MMHG

## 2022-07-08 DIAGNOSIS — I34.1 MVP (MITRAL VALVE PROLAPSE): ICD-10-CM

## 2022-07-08 DIAGNOSIS — Z11.51 SCREENING FOR HUMAN PAPILLOMAVIRUS (HPV): ICD-10-CM

## 2022-07-08 DIAGNOSIS — Z12.4 ENCOUNTER FOR PAPANICOLAOU SMEAR FOR CERVICAL CANCER SCREENING: ICD-10-CM

## 2022-07-08 DIAGNOSIS — Z12.31 ENCOUNTER FOR SCREENING MAMMOGRAM FOR BREAST CANCER: ICD-10-CM

## 2022-07-08 DIAGNOSIS — Z00.00 ROUTINE GENERAL MEDICAL EXAMINATION AT A HEALTH CARE FACILITY: Primary | ICD-10-CM

## 2022-07-08 PROCEDURE — 3008F BODY MASS INDEX DOCD: CPT | Performed by: INTERNAL MEDICINE

## 2022-07-08 PROCEDURE — 3078F DIAST BP <80 MM HG: CPT | Performed by: INTERNAL MEDICINE

## 2022-07-08 PROCEDURE — 99396 PREV VISIT EST AGE 40-64: CPT | Performed by: INTERNAL MEDICINE

## 2022-07-08 PROCEDURE — 87624 HPV HI-RISK TYP POOLED RSLT: CPT | Performed by: INTERNAL MEDICINE

## 2022-07-08 PROCEDURE — 88175 CYTOPATH C/V AUTO FLUID REDO: CPT | Performed by: INTERNAL MEDICINE

## 2022-07-08 PROCEDURE — 3074F SYST BP LT 130 MM HG: CPT | Performed by: INTERNAL MEDICINE

## 2022-07-11 LAB — HPV I/H RISK 1 DNA SPEC QL NAA+PROBE: NEGATIVE

## 2022-07-15 ENCOUNTER — HOSPITAL ENCOUNTER (OUTPATIENT)
Dept: MAMMOGRAPHY | Age: 62
Discharge: HOME | End: 2022-07-15
Attending: INTERNAL MEDICINE
Payer: COMMERCIAL

## 2022-07-15 DIAGNOSIS — Z12.31 ENCOUNTER FOR SCREENING MAMMOGRAM FOR BREAST CANCER: ICD-10-CM

## 2022-07-15 PROCEDURE — 77067 SCR MAMMO BI INCL CAD: CPT | Performed by: INTERNAL MEDICINE

## 2022-07-15 PROCEDURE — 77063 BREAST TOMOSYNTHESIS BI: CPT | Performed by: INTERNAL MEDICINE

## 2022-07-19 ENCOUNTER — LAB ENCOUNTER (OUTPATIENT)
Dept: LAB | Age: 62
End: 2022-07-19
Attending: INTERNAL MEDICINE
Payer: COMMERCIAL

## 2022-07-19 DIAGNOSIS — Z00.00 ROUTINE GENERAL MEDICAL EXAMINATION AT A HEALTH CARE FACILITY: ICD-10-CM

## 2022-07-19 LAB
ALBUMIN SERPL-MCNC: 3.9 G/DL (ref 3.4–5)
ALBUMIN/GLOB SERPL: 1.2 {RATIO} (ref 1–2)
ALP LIVER SERPL-CCNC: 53 U/L
ALT SERPL-CCNC: 27 U/L
ANION GAP SERPL CALC-SCNC: 7 MMOL/L (ref 0–18)
AST SERPL-CCNC: 20 U/L (ref 15–37)
BASOPHILS # BLD AUTO: 0.03 X10(3) UL (ref 0–0.2)
BASOPHILS NFR BLD AUTO: 0.5 %
BILIRUB SERPL-MCNC: 0.7 MG/DL (ref 0.1–2)
BUN BLD-MCNC: 12 MG/DL (ref 7–18)
CALCIUM BLD-MCNC: 9.5 MG/DL (ref 8.5–10.1)
CHLORIDE SERPL-SCNC: 108 MMOL/L (ref 98–112)
CHOLEST SERPL-MCNC: 192 MG/DL (ref ?–200)
CO2 SERPL-SCNC: 27 MMOL/L (ref 21–32)
CREAT BLD-MCNC: 0.7 MG/DL
EOSINOPHIL # BLD AUTO: 0.27 X10(3) UL (ref 0–0.7)
EOSINOPHIL NFR BLD AUTO: 4.6 %
ERYTHROCYTE [DISTWIDTH] IN BLOOD BY AUTOMATED COUNT: 12.6 %
FASTING PATIENT LIPID ANSWER: YES
FASTING STATUS PATIENT QL REPORTED: YES
GLOBULIN PLAS-MCNC: 3.2 G/DL (ref 2.8–4.4)
GLUCOSE BLD-MCNC: 86 MG/DL (ref 70–99)
HCT VFR BLD AUTO: 41.6 %
HDLC SERPL-MCNC: 70 MG/DL (ref 40–59)
HGB BLD-MCNC: 13.2 G/DL
IMM GRANULOCYTES # BLD AUTO: 0.01 X10(3) UL (ref 0–1)
IMM GRANULOCYTES NFR BLD: 0.2 %
LDLC SERPL CALC-MCNC: 110 MG/DL (ref ?–100)
LYMPHOCYTES # BLD AUTO: 1.8 X10(3) UL (ref 1–4)
LYMPHOCYTES NFR BLD AUTO: 30.9 %
MCH RBC QN AUTO: 30.3 PG (ref 26–34)
MCHC RBC AUTO-ENTMCNC: 31.7 G/DL (ref 31–37)
MCV RBC AUTO: 95.6 FL
MONOCYTES # BLD AUTO: 0.44 X10(3) UL (ref 0.1–1)
MONOCYTES NFR BLD AUTO: 7.6 %
NEUTROPHILS # BLD AUTO: 3.27 X10 (3) UL (ref 1.5–7.7)
NEUTROPHILS # BLD AUTO: 3.27 X10(3) UL (ref 1.5–7.7)
NEUTROPHILS NFR BLD AUTO: 56.2 %
NONHDLC SERPL-MCNC: 122 MG/DL (ref ?–130)
OSMOLALITY SERPL CALC.SUM OF ELEC: 293 MOSM/KG (ref 275–295)
PLATELET # BLD AUTO: 240 10(3)UL (ref 150–450)
POTASSIUM SERPL-SCNC: 4.1 MMOL/L (ref 3.5–5.1)
PROT SERPL-MCNC: 7.1 G/DL (ref 6.4–8.2)
RBC # BLD AUTO: 4.35 X10(6)UL
SODIUM SERPL-SCNC: 142 MMOL/L (ref 136–145)
TRIGL SERPL-MCNC: 67 MG/DL (ref 30–149)
TSI SER-ACNC: 3.65 MIU/ML (ref 0.36–3.74)
VLDLC SERPL CALC-MCNC: 11 MG/DL (ref 0–30)
WBC # BLD AUTO: 5.8 X10(3) UL (ref 4–11)

## 2022-07-19 PROCEDURE — 84443 ASSAY THYROID STIM HORMONE: CPT

## 2022-07-19 PROCEDURE — 85025 COMPLETE CBC W/AUTO DIFF WBC: CPT

## 2022-07-19 PROCEDURE — 80053 COMPREHEN METABOLIC PANEL: CPT

## 2022-07-19 PROCEDURE — 36415 COLL VENOUS BLD VENIPUNCTURE: CPT

## 2022-07-19 PROCEDURE — 80061 LIPID PANEL: CPT

## 2022-07-29 ENCOUNTER — HOSPITAL ENCOUNTER (OUTPATIENT)
Dept: MAMMOGRAPHY | Facility: HOSPITAL | Age: 62
Discharge: HOME | End: 2022-07-29
Attending: INTERNAL MEDICINE
Payer: COMMERCIAL

## 2022-07-29 DIAGNOSIS — R92.2 INCONCLUSIVE MAMMOGRAM: ICD-10-CM

## 2022-07-29 PROCEDURE — 77065 DX MAMMO INCL CAD UNI: CPT | Performed by: INTERNAL MEDICINE

## 2022-07-29 PROCEDURE — 77061 BREAST TOMOSYNTHESIS UNI: CPT | Performed by: INTERNAL MEDICINE

## 2023-05-22 ENCOUNTER — HOSPITAL ENCOUNTER (OUTPATIENT)
Age: 63
Discharge: HOME | End: 2023-05-22
Payer: COMMERCIAL

## 2023-05-22 VITALS
DIASTOLIC BLOOD PRESSURE: 74 MMHG | HEART RATE: 83 BPM | BODY MASS INDEX: 23.9 KG/M2 | SYSTOLIC BLOOD PRESSURE: 124 MMHG | WEIGHT: 140 LBS | RESPIRATION RATE: 20 BRPM | OXYGEN SATURATION: 95 % | HEIGHT: 64 IN | TEMPERATURE: 98 F

## 2023-05-22 DIAGNOSIS — J40 BRONCHITIS: Primary | ICD-10-CM

## 2023-05-22 PROCEDURE — 99213 OFFICE O/P EST LOW 20 MIN: CPT | Performed by: NURSE PRACTITIONER

## 2023-05-22 RX ORDER — CODEINE PHOSPHATE AND GUAIFENESIN 10; 100 MG/5ML; MG/5ML
5 SOLUTION ORAL EVERY 6 HOURS PRN
Qty: 180 ML | Refills: 0 | Status: SHIPPED | OUTPATIENT
Start: 2023-05-22

## 2023-05-22 RX ORDER — BENZONATATE 100 MG/1
100 CAPSULE ORAL 3 TIMES DAILY PRN
Qty: 30 CAPSULE | Refills: 0 | Status: SHIPPED | OUTPATIENT
Start: 2023-05-22 | End: 2023-06-21

## 2023-05-22 RX ORDER — AZELASTINE 1 MG/ML
1 SPRAY, METERED NASAL 2 TIMES DAILY
Qty: 30 ML | Refills: 0 | Status: SHIPPED | OUTPATIENT
Start: 2023-05-22

## 2023-05-22 NOTE — ED INITIAL ASSESSMENT (HPI)
Coughing x 1 week. Seems like getting worse - especially at noc. Temporary relief w OTC cough meds. Ear & sinus  Pressure. Denies fever or chills. Negative Covid PTA.

## 2023-05-22 NOTE — DISCHARGE INSTRUCTIONS
Drink plenty fluids  During the day, you may use Mucinex DM one tab every 12 hours    Follow-up with your family doctor in 2-3 days if no better  Return to ED for any worsening or persisting symptoms, shortness of breath, chest pain, dizziness, fever.

## 2023-08-21 ENCOUNTER — TELEPHONE (OUTPATIENT)
Dept: INTERNAL MEDICINE CLINIC | Facility: CLINIC | Age: 63
End: 2023-08-21

## 2023-09-08 ENCOUNTER — OFFICE VISIT (OUTPATIENT)
Dept: INTERNAL MEDICINE CLINIC | Facility: CLINIC | Age: 63
End: 2023-09-08
Payer: COMMERCIAL

## 2023-09-08 VITALS
BODY MASS INDEX: 24.62 KG/M2 | DIASTOLIC BLOOD PRESSURE: 68 MMHG | RESPIRATION RATE: 16 BRPM | HEIGHT: 64.5 IN | WEIGHT: 146 LBS | HEART RATE: 68 BPM | SYSTOLIC BLOOD PRESSURE: 120 MMHG

## 2023-09-08 DIAGNOSIS — Z78.0 POSTMENOPAUSAL: ICD-10-CM

## 2023-09-08 DIAGNOSIS — Z13.820 SCREENING FOR OSTEOPOROSIS: ICD-10-CM

## 2023-09-08 DIAGNOSIS — Z12.31 ENCOUNTER FOR SCREENING MAMMOGRAM FOR BREAST CANCER: ICD-10-CM

## 2023-09-08 DIAGNOSIS — Z12.83 SKIN CANCER SCREENING: ICD-10-CM

## 2023-09-08 DIAGNOSIS — Z13.29 SCREENING FOR THYROID DISORDER: ICD-10-CM

## 2023-09-08 DIAGNOSIS — Z13.220 SCREENING FOR LIPID DISORDERS: ICD-10-CM

## 2023-09-08 DIAGNOSIS — Z00.00 ENCOUNTER FOR ANNUAL PHYSICAL EXAM: Primary | ICD-10-CM

## 2023-09-08 DIAGNOSIS — Z23 NEED FOR VACCINATION: ICD-10-CM

## 2023-09-08 DIAGNOSIS — I34.1 MVP (MITRAL VALVE PROLAPSE): ICD-10-CM

## 2023-09-08 PROCEDURE — 90471 IMMUNIZATION ADMIN: CPT | Performed by: NURSE PRACTITIONER

## 2023-09-08 PROCEDURE — 90472 IMMUNIZATION ADMIN EACH ADD: CPT | Performed by: NURSE PRACTITIONER

## 2023-09-08 PROCEDURE — 90677 PCV20 VACCINE IM: CPT | Performed by: NURSE PRACTITIONER

## 2023-09-08 PROCEDURE — 90715 TDAP VACCINE 7 YRS/> IM: CPT | Performed by: NURSE PRACTITIONER

## 2023-09-08 PROCEDURE — 3074F SYST BP LT 130 MM HG: CPT | Performed by: NURSE PRACTITIONER

## 2023-09-08 PROCEDURE — 99397 PER PM REEVAL EST PAT 65+ YR: CPT | Performed by: NURSE PRACTITIONER

## 2023-09-08 PROCEDURE — 3008F BODY MASS INDEX DOCD: CPT | Performed by: NURSE PRACTITIONER

## 2023-09-08 PROCEDURE — 3078F DIAST BP <80 MM HG: CPT | Performed by: NURSE PRACTITIONER

## 2023-09-08 NOTE — PATIENT INSTRUCTIONS
Get your labs done. You should be fasting for at least 10 hours. If you take a multivitamin with Biotin or any biotin product it should be held for 3 days prior to getting your labs done. If you use [facility] labs, you may schedule at [phone] or on-line at [address] the mammogram and DEXA scan done. Get the rsv, flu, and covid booster when available at local pharmacy. Do not combine any vaccines with rsv vaccine and wait at least 2 weeks to get any other vaccines.

## 2023-09-15 ENCOUNTER — LAB ENCOUNTER (OUTPATIENT)
Dept: LAB | Age: 63
End: 2023-09-15
Attending: NURSE PRACTITIONER
Payer: COMMERCIAL

## 2023-09-15 DIAGNOSIS — Z13.29 SCREENING FOR THYROID DISORDER: ICD-10-CM

## 2023-09-15 DIAGNOSIS — Z00.00 ENCOUNTER FOR ANNUAL PHYSICAL EXAM: ICD-10-CM

## 2023-09-15 DIAGNOSIS — Z13.220 SCREENING FOR LIPID DISORDERS: ICD-10-CM

## 2023-09-15 LAB
ALBUMIN SERPL-MCNC: 4.1 G/DL (ref 3.4–5)
ALBUMIN/GLOB SERPL: 1.2 {RATIO} (ref 1–2)
ALP LIVER SERPL-CCNC: 58 U/L
ALT SERPL-CCNC: 28 U/L
ANION GAP SERPL CALC-SCNC: 8 MMOL/L (ref 0–18)
AST SERPL-CCNC: 20 U/L (ref 15–37)
BASOPHILS # BLD AUTO: 0.06 X10(3) UL (ref 0–0.2)
BASOPHILS NFR BLD AUTO: 0.8 %
BILIRUB SERPL-MCNC: 0.5 MG/DL (ref 0.1–2)
BUN BLD-MCNC: 15 MG/DL (ref 7–18)
CALCIUM BLD-MCNC: 9.5 MG/DL (ref 8.5–10.1)
CHLORIDE SERPL-SCNC: 106 MMOL/L (ref 98–112)
CHOLEST SERPL-MCNC: 197 MG/DL (ref ?–200)
CO2 SERPL-SCNC: 26 MMOL/L (ref 21–32)
CREAT BLD-MCNC: 0.71 MG/DL
EGFRCR SERPLBLD CKD-EPI 2021: 94 ML/MIN/1.73M2 (ref 60–?)
EOSINOPHIL # BLD AUTO: 0.29 X10(3) UL (ref 0–0.7)
EOSINOPHIL NFR BLD AUTO: 4 %
ERYTHROCYTE [DISTWIDTH] IN BLOOD BY AUTOMATED COUNT: 12.2 %
FASTING PATIENT LIPID ANSWER: YES
FASTING STATUS PATIENT QL REPORTED: YES
GLOBULIN PLAS-MCNC: 3.3 G/DL (ref 2.8–4.4)
GLUCOSE BLD-MCNC: 90 MG/DL (ref 70–99)
HCT VFR BLD AUTO: 40 %
HDLC SERPL-MCNC: 60 MG/DL (ref 40–59)
HGB BLD-MCNC: 12.7 G/DL
IMM GRANULOCYTES # BLD AUTO: 0.02 X10(3) UL (ref 0–1)
IMM GRANULOCYTES NFR BLD: 0.3 %
LDLC SERPL CALC-MCNC: 124 MG/DL (ref ?–100)
LYMPHOCYTES # BLD AUTO: 2.41 X10(3) UL (ref 1–4)
LYMPHOCYTES NFR BLD AUTO: 33.2 %
MCH RBC QN AUTO: 29.2 PG (ref 26–34)
MCHC RBC AUTO-ENTMCNC: 31.8 G/DL (ref 31–37)
MCV RBC AUTO: 92 FL
MONOCYTES # BLD AUTO: 0.67 X10(3) UL (ref 0.1–1)
MONOCYTES NFR BLD AUTO: 9.2 %
NEUTROPHILS # BLD AUTO: 3.81 X10 (3) UL (ref 1.5–7.7)
NEUTROPHILS # BLD AUTO: 3.81 X10(3) UL (ref 1.5–7.7)
NEUTROPHILS NFR BLD AUTO: 52.5 %
NONHDLC SERPL-MCNC: 137 MG/DL (ref ?–130)
OSMOLALITY SERPL CALC.SUM OF ELEC: 290 MOSM/KG (ref 275–295)
PLATELET # BLD AUTO: 235 10(3)UL (ref 150–450)
POTASSIUM SERPL-SCNC: 4 MMOL/L (ref 3.5–5.1)
PROT SERPL-MCNC: 7.4 G/DL (ref 6.4–8.2)
RBC # BLD AUTO: 4.35 X10(6)UL
SODIUM SERPL-SCNC: 140 MMOL/L (ref 136–145)
TRIGL SERPL-MCNC: 72 MG/DL (ref 30–149)
TSI SER-ACNC: 2.58 MIU/ML (ref 0.36–3.74)
VLDLC SERPL CALC-MCNC: 13 MG/DL (ref 0–30)
WBC # BLD AUTO: 7.3 X10(3) UL (ref 4–11)

## 2023-09-15 PROCEDURE — 84443 ASSAY THYROID STIM HORMONE: CPT

## 2023-09-15 PROCEDURE — 80061 LIPID PANEL: CPT

## 2023-09-15 PROCEDURE — 85025 COMPLETE CBC W/AUTO DIFF WBC: CPT

## 2023-09-15 PROCEDURE — 36415 COLL VENOUS BLD VENIPUNCTURE: CPT

## 2023-09-15 PROCEDURE — 80053 COMPREHEN METABOLIC PANEL: CPT

## 2023-09-29 ENCOUNTER — HOSPITAL ENCOUNTER (OUTPATIENT)
Dept: MAMMOGRAPHY | Age: 63
Discharge: HOME | End: 2023-09-29
Attending: NURSE PRACTITIONER
Payer: COMMERCIAL

## 2023-09-29 DIAGNOSIS — Z12.31 ENCOUNTER FOR SCREENING MAMMOGRAM FOR BREAST CANCER: ICD-10-CM

## 2023-09-29 PROCEDURE — 77067 SCR MAMMO BI INCL CAD: CPT | Performed by: NURSE PRACTITIONER

## 2023-09-29 PROCEDURE — 77063 BREAST TOMOSYNTHESIS BI: CPT | Performed by: NURSE PRACTITIONER

## 2023-10-18 ENCOUNTER — HOSPITAL ENCOUNTER (OUTPATIENT)
Dept: MAMMOGRAPHY | Facility: HOSPITAL | Age: 63
Discharge: HOME | End: 2023-10-18
Attending: NURSE PRACTITIONER
Payer: COMMERCIAL

## 2023-10-18 DIAGNOSIS — N63.20 BREAST MASS, LEFT: ICD-10-CM

## 2023-10-18 DIAGNOSIS — R92.2 INCONCLUSIVE MAMMOGRAM: ICD-10-CM

## 2023-10-18 DIAGNOSIS — R92.8 ABNORMAL MAMMOGRAM: ICD-10-CM

## 2023-10-18 PROCEDURE — 88344 IMHCHEM/IMCYTCHM EA MLT ANTB: CPT | Performed by: NURSE PRACTITIONER

## 2023-10-18 PROCEDURE — 77066 DX MAMMO INCL CAD BI: CPT | Performed by: NURSE PRACTITIONER

## 2023-10-18 PROCEDURE — 77062 BREAST TOMOSYNTHESIS BI: CPT | Performed by: NURSE PRACTITIONER

## 2023-10-18 PROCEDURE — 88305 TISSUE EXAM BY PATHOLOGIST: CPT | Performed by: NURSE PRACTITIONER

## 2023-10-18 PROCEDURE — 19083 BX BREAST 1ST LESION US IMAG: CPT | Performed by: NURSE PRACTITIONER

## 2023-10-18 PROCEDURE — 88342 IMHCHEM/IMCYTCHM 1ST ANTB: CPT | Performed by: NURSE PRACTITIONER

## 2023-10-18 PROCEDURE — 76642 ULTRASOUND BREAST LIMITED: CPT | Performed by: NURSE PRACTITIONER

## 2023-10-18 PROCEDURE — 88360 TUMOR IMMUNOHISTOCHEM/MANUAL: CPT | Performed by: NURSE PRACTITIONER

## 2023-10-18 PROCEDURE — 77065 DX MAMMO INCL CAD UNI: CPT | Performed by: NURSE PRACTITIONER

## 2023-10-18 NOTE — IMAGING NOTE
This Breast Care RN assisted Dr. Samy Millard with recommendation for a left breast 1 site ultrasound guided biopsy for mass. Procedure reviewed and all questions answered. Emotional and educational support given. On the day of the biopsy, pt instructed to take Tylenol 1000mg PO, eat a light meal & bring or wear a sports bra. Post biopsy care also reviewed with pt to include NO lifting more than 5lbs, no exercising or housework (limit upper body movement) for 24-48 hrs post biopsy. Patient denies blood thinners, bleeding disorders, liver disease, and chemo. Pt verbalized understanding. The patient has been scheduled for an appt today at 3 pm, arriving at 2:30.

## 2023-10-19 ENCOUNTER — TELEPHONE (OUTPATIENT)
Dept: INTERNAL MEDICINE CLINIC | Facility: CLINIC | Age: 63
End: 2023-10-19

## 2023-10-19 NOTE — TELEPHONE ENCOUNTER
Patient said she spoke with [facility] yesterday about her mammogram and biopsy she had. She should get the results of the biopsy next week. She is wondering if she can have a prescription for anxiety meds sent to the [address] [place], [place] [address] AT [address] [place] [address]. Does patient need an appt wince she spoke with [facility] yesterday? Please advise. Thank you!

## 2023-10-19 NOTE — TELEPHONE ENCOUNTER
Yes needs an appt. Can be a VV if she prefers. I only spoke to her about the biopsy results in process and we will inform her of results once released. There was no discussion of anxiety or anxiety treatment.

## 2023-10-20 ENCOUNTER — TELEPHONE (OUTPATIENT)
Dept: MAMMOGRAPHY | Facility: HOSPITAL | Age: 63
End: 2023-10-20

## 2023-10-20 NOTE — TELEPHONE ENCOUNTER
Called and spoke to patient. Patient accepted an appt with Dr Kofi Vasquez on Tues 10-31-23 at 9 am. Address provided. Appt confirmed with navigators. Patient verbalized understanding and has no further questions at this time.

## 2023-10-20 NOTE — TELEPHONE ENCOUNTER
Telephoned Julia Medeiros and name,  verified with patient. Notified Dirko Jarred of left breast positive for [address] biopsy result. Concordance pending. Rollo Conshohocken reports biopsy site is healing well. Radiologist recommends surgical consultation. [facility] office is referring patient to Dr Catherine Reed. Patient was provided with the contact information for Dr Catherine Reed, the Breast Program Navigators, and myself and informed that one of us will be calling her back with an appt date and time for Dr Catherine Reed. Pt verbalized understanding and had no further questions at this time.

## 2023-10-21 ENCOUNTER — PATIENT OUTREACH (OUTPATIENT)
Dept: INTERNAL MEDICINE CLINIC | Facility: CLINIC | Age: 63
End: 2023-10-21

## 2023-10-21 NOTE — PROGRESS NOTES
Patient was called and breast biopsy results were discussed in detail. Patient's questions were answered. She has an appointment with Dr. Ariana Morfin on 10/31/23. Video visit appt scheduled for 10/23/23 to discuss starting anxiety medication. Denies any SHIP.

## 2023-10-23 ENCOUNTER — TELEPHONE (OUTPATIENT)
Dept: HEMATOLOGY/ONCOLOGY | Facility: HOSPITAL | Age: 63
End: 2023-10-23

## 2023-10-23 NOTE — TELEPHONE ENCOUNTER
Left message for patient to call back. New cancer diagnosis, calling to explain role of navigator and reconfirm her appointment on 10/31.

## 2023-10-27 ENCOUNTER — OFFICE VISIT (OUTPATIENT)
Dept: SURGERY | Facility: CLINIC | Age: 63
End: 2023-10-27

## 2023-10-27 ENCOUNTER — NURSE NAVIGATOR ENCOUNTER (OUTPATIENT)
Dept: HEMATOLOGY/ONCOLOGY | Facility: HOSPITAL | Age: 63
End: 2023-10-27

## 2023-10-27 VITALS
RESPIRATION RATE: 17 BRPM | OXYGEN SATURATION: 97 % | TEMPERATURE: 97 F | HEART RATE: 77 BPM | HEIGHT: 64 IN | WEIGHT: 143 LBS | BODY MASS INDEX: 24.41 KG/M2 | SYSTOLIC BLOOD PRESSURE: 145 MMHG | DIASTOLIC BLOOD PRESSURE: 87 MMHG

## 2023-10-27 DIAGNOSIS — C50.412 INFILTRATING DUCTAL CARCINOMA OF UPPER-OUTER QUADRANT OF LEFT BREAST IN FEMALE (HCC): Primary | ICD-10-CM

## 2023-10-27 PROCEDURE — 3077F SYST BP >= 140 MM HG: CPT | Performed by: SURGERY

## 2023-10-27 PROCEDURE — 3008F BODY MASS INDEX DOCD: CPT | Performed by: SURGERY

## 2023-10-27 PROCEDURE — 3079F DIAST BP 80-89 MM HG: CPT | Performed by: SURGERY

## 2023-10-27 PROCEDURE — 99205 OFFICE O/P NEW HI 60 MIN: CPT | Performed by: SURGERY

## 2023-10-27 NOTE — PATIENT INSTRUCTIONS
Dr. Loni Bingham  Tel: [phone]  Fax: [address] [place] [address] [address] [facility], [address] [place] [address] Rd  [phone]     Surgery/Procedure: Left breast wire localized lumpectomy, left breast lymphoscintigraphy, left sentinel lymph node biopsy  FINAL SURGICAL SHEET PENDING MRI RESULTS     Anesthesia:   Gen  Surgery Length:   1.5 hours CPT:     Wire LOC:   Yes Nuc Med:   Yes Mamta Seed:  No       Dx & ICD-10:    Radiology Instructions:    _______________________________________________________________________________    Someone must accompany you the day of the procedure to drive you home safely, because of anesthesia. You will need an adult  to stay with you the first night following your surgery. You must remove any kind of makeup, acrylic nails, lotions, powders, creams or deodorant. [place] ONLY: Pre-admission will give instruct you on when to take Gatorade and Tylenol/acetaminophen prior to your surgery, purchase 2 - 12oz bottles of regular Gatorade (NOT RED/SUGAR FREE). Otherwise, you may not eat or drink anything else after 11PM the night before surgery. [place] ONLY: You may not eat or drink anything after midnight the day of your surgery. Wear comfortable clothing that can be easily removed. If you wear dentures, contacts lenses, or any prosthesis, you will be asked to remove them. Do not drink alcohol or smoke 24 hours prior to your procedure. Bring a picture ID and your insurance card. Covid-19 testing is no longer required before surgery unless you are experiencing symptoms such as fever, cough, congestion, etc.   The Pre-Admission Testing Department will call the day before to confirm your procedure, give you the time you need to arrive by and directions on where to go. They begin making calls after 2pm, if you are not contacted by 4pm, please call the surgeon's office listed above. Do not take any blood thinners at least one week prior to the procedure/surgery. This includes aspirin, baby aspirin, Ibuprofen products, herbal supplements, diet medications, vitamin E, fish oil and green tea supplements. Please check other supplements for these ingredients. *TYLENOL or acetaminophen is acceptable*  If you take Coumadin, Plavix, Xarelto, or Eliquis, please contact your prescribing physician for special instructions on how long to hold. If you take insulin contact your primary care physician for special instructions. Our surgery scheduler, Micah Tomlinson, will be contacting you to discuss surgery dates. If you have any questions related to scheduling your surgery, please reach out to her at [phone].  _____________________________________________________________________  PRE-OPERATIVE TESTING IF INDICATED BELOW  PLEASE COMPLETE ASAP (AT LEAST 14-21 DAYS PRIOR TO SURGERY)  [] CBC [] BMP [] CMP [] EKG    [] PT, PTT, INR [] Cardiac Clearance  [] H&P Medical Clearance [] Chest X-ray     Please call Central Scheduling to schedule an appointment for pre-operative labs/tests @ [phone]  Does the patient have a pacemaker or ICD? Does the patient have sleep apnea?   [] Yes   [x] No                               [] Yes   [x] No

## 2023-10-27 NOTE — PROGRESS NOTES
Met with patient in clinic. Introduced myself as one the breast nurse navigators and explained the role of the breast nurse navigator and coordination of care. Explained the role of all of the physicians involved in her care including the surgeon, medical oncologist, radiation oncologist, and possibly plastic surgeon. Reviewed over the patients pathology report and discussed receptors including ER/HI/ and Her 2. Patient was given the breast cancer treatment handbook and reviewed over how to use this resource. Discussed the breast multidisciplinary conference and that her case will be discussed. Patient was given the contact information for the social workers at the [facility] and resources for support as requested. Breast cancer journey map provided to patient. Provided lumpectomy surgical sheet. Next step in care will be to have MRI on 11/2 at 745pm, she is aware of the appointment. Pt was provided with breast nurse navigator contact information and was encouraged to phone with any other questions or concerns.

## 2023-11-05 ENCOUNTER — HOSPITAL ENCOUNTER (OUTPATIENT)
Dept: MRI IMAGING | Facility: HOSPITAL | Age: 63
Discharge: HOME | End: 2023-11-05
Attending: SURGERY
Payer: COMMERCIAL

## 2023-11-05 ENCOUNTER — HOSPITAL ENCOUNTER (OUTPATIENT)
Dept: MRI IMAGING | Facility: HOSPITAL | Age: 63
End: 2023-11-05
Attending: SURGERY
Payer: COMMERCIAL

## 2023-11-05 DIAGNOSIS — C50.412 INFILTRATING DUCTAL CARCINOMA OF UPPER-OUTER QUADRANT OF LEFT BREAST IN FEMALE (HCC): ICD-10-CM

## 2023-11-05 PROCEDURE — A9575 INJ GADOTERATE MEGLUMI 0.1ML: HCPCS | Performed by: SURGERY

## 2023-11-05 PROCEDURE — 77049 MRI BREAST C-+ W/CAD BI: CPT | Performed by: SURGERY

## 2023-11-05 RX ORDER — GADOTERATE MEGLUMINE 376.9 MG/ML
15 INJECTION INTRAVENOUS
Status: COMPLETED | OUTPATIENT
Start: 2023-11-05 | End: 2023-11-05

## 2023-11-05 RX ADMIN — GADOTERATE MEGLUMINE 14 ML: 376.9 INJECTION INTRAVENOUS at 13:36:00

## 2023-11-06 ENCOUNTER — NURSE NAVIGATOR ENCOUNTER (OUTPATIENT)
Dept: HEMATOLOGY/ONCOLOGY | Facility: HOSPITAL | Age: 63
End: 2023-11-06

## 2023-11-06 ENCOUNTER — TELEPHONE (OUTPATIENT)
Dept: SURGERY | Facility: CLINIC | Age: 63
End: 2023-11-06

## 2023-11-06 DIAGNOSIS — C50.919 INVASIVE LOBULAR CARCINOMA OF BREAST IN FEMALE (HCC): Primary | ICD-10-CM

## 2023-11-06 NOTE — PROGRESS NOTES
Patient called asking what the next step in her care is after receiving a note from her breast MRI results from Dr. Micky Rice. I stated that she will receive a phone call from, Dr. Beverly Marlow surgery scheduler and I will contact her to reach out to her to look for surgery dates. We discussed the sentinel lymph node and wire localization procedures and I answered her questions to the best of my ability. She thanked me for the assistance. Pt was provided with the breast nurse navigators contact information and was encouraged to phone with any other questions or concerns.

## 2023-11-06 NOTE — TELEPHONE ENCOUNTER
Calling pt in regards to scheduling surgery. Informed pt that I have 11/27/2023 available at [facility] with Dr. Jigar Fenton. Pt verbalized understanding and in agreement with date and location. All questions answered. Encouraged pt to call or Pin or Pegt message office with any other questions or concerns.

## 2023-11-13 DIAGNOSIS — C50.412 INFILTRATING DUCTAL CARCINOMA OF UPPER-OUTER QUADRANT OF LEFT BREAST IN FEMALE (HCC): Primary | ICD-10-CM

## 2023-11-16 ENCOUNTER — TELEPHONE (OUTPATIENT)
Dept: SURGERY | Facility: CLINIC | Age: 63
End: 2023-11-16

## 2023-11-16 DIAGNOSIS — C50.412 INFILTRATING DUCTAL CARCINOMA OF UPPER-OUTER QUADRANT OF LEFT BREAST IN FEMALE (HCC): Primary | ICD-10-CM

## 2023-11-16 NOTE — TELEPHONE ENCOUNTER
Patient  called and informed me she would like to cancel her surgery with Dr. Kofi Vasquez on 11/27/2023 she has decided to go with another provider

## 2023-11-21 ENCOUNTER — TELEPHONE (OUTPATIENT)
Dept: INTERNAL MEDICINE CLINIC | Facility: CLINIC | Age: 63
End: 2023-11-21

## 2023-11-21 DIAGNOSIS — F41.8 SITUATIONAL ANXIETY: ICD-10-CM

## 2023-11-21 NOTE — TELEPHONE ENCOUNTER
Rec call from patient. Patient picked up refill yesterday and requesting more refills. Advised to let us know closer to when new refill is low (2 weeks or after) for refill to be sent to pharmacy. Verbalizes understanding. No additional questions.

## 2023-11-21 NOTE — TELEPHONE ENCOUNTER
[facility] on 10.23.23. patient should have ample supply.

## 2023-11-21 NOTE — TELEPHONE ENCOUNTER
Is this medication prescribed by the [facility] Providers? yes    Did the patient contact the pharmacy directly?:  yes    Is patient out of meds or supply very low?:  completely out. No refills left. Medication Requested:  escitalopram 10 MG Oral Tab     Dose:      Is patient requesting a 30 or 90 day supply?:  30    Pharmacy name and phone # or location:    [place] [address] #[id number] - [facility], [address] [place] [address], [phone], [phone]       Is the patient due for an appointment?: no  (if so, please schedule appt)    Additional Notes:      Please advise the patient refills take up to 72 business hours.

## 2023-12-22 DIAGNOSIS — F41.8 SITUATIONAL ANXIETY: ICD-10-CM

## 2023-12-26 NOTE — TELEPHONE ENCOUNTER
Last OV relevant to medication: 10/23/23  Last refill date: 10/23/23 #30/refills: 1  When pt was asked to return for OV: as needed  Upcoming appt/reason:   Future Appointments   Date Time Provider Guillermina Lr   1/2/2024  9:00 AM Jocelyn Stubbs MD [facility] HEM ONC Rosalba Araujo   Was pt informed of any over due labs: utd    Pt did not want to be on long term, was advised to reach out for weaning instructions. Message sent to pt.

## 2023-12-27 RX ORDER — ESCITALOPRAM OXALATE 10 MG/1
10 TABLET ORAL DAILY
Qty: 90 TABLET | Refills: 0 | Status: SHIPPED | OUTPATIENT
Start: 2023-12-27

## 2023-12-27 NOTE — TELEPHONE ENCOUNTER
Please see pt response and OV notes 10/23/23. Pt would like to continue for another 60-90 days, pended for #90/0. Thanks!

## 2024-01-02 ENCOUNTER — OFFICE VISIT (OUTPATIENT)
Dept: HEMATOLOGY/ONCOLOGY | Facility: HOSPITAL | Age: 64
End: 2024-01-02
Attending: INTERNAL MEDICINE
Payer: COMMERCIAL

## 2024-01-02 ENCOUNTER — NURSE NAVIGATOR ENCOUNTER (OUTPATIENT)
Dept: HEMATOLOGY/ONCOLOGY | Facility: HOSPITAL | Age: 64
End: 2024-01-02

## 2024-01-02 VITALS
OXYGEN SATURATION: 99 % | WEIGHT: 142 LBS | TEMPERATURE: 98 F | SYSTOLIC BLOOD PRESSURE: 143 MMHG | RESPIRATION RATE: 16 BRPM | BODY MASS INDEX: 24.54 KG/M2 | DIASTOLIC BLOOD PRESSURE: 83 MMHG | HEART RATE: 75 BPM | HEIGHT: 63.9 IN

## 2024-01-02 DIAGNOSIS — Z78.0 ASYMPTOMATIC MENOPAUSE: ICD-10-CM

## 2024-01-02 DIAGNOSIS — M89.9 HUMERUS LESION, LEFT: ICD-10-CM

## 2024-01-02 DIAGNOSIS — Z17.0 MALIGNANT NEOPLASM OF UPPER-OUTER QUADRANT OF LEFT BREAST IN FEMALE, ESTROGEN RECEPTOR POSITIVE  (HCC): Primary | ICD-10-CM

## 2024-01-02 DIAGNOSIS — C50.412 MALIGNANT NEOPLASM OF UPPER-OUTER QUADRANT OF LEFT BREAST IN FEMALE, ESTROGEN RECEPTOR POSITIVE  (HCC): Primary | ICD-10-CM

## 2024-01-02 PROCEDURE — 99205 OFFICE O/P NEW HI 60 MIN: CPT | Performed by: INTERNAL MEDICINE

## 2024-01-02 NOTE — PROGRESS NOTES
Met with patient in clinic, she had Oncotype testing done at [facility].  Patient will share results with Vaughn when available.  Patient signed release and requested slides from [facility].  Patient will get called to schedule RT consult.

## 2024-01-02 NOTE — CONSULTS
[facility] Report of Consultation    Patient Name: Julia Medeiros   YOB: 1960   Medical Record Number: [id number]   CSN: [id number]   Consulting Physician: Scooter Stubbs MD  Referring Physician(s): No ref. provider found  Date of Consultation: 1/2/2024     Reason for Consultation:  Julia Medeiros was seen today in the Cancer Center for evaluation and management of left breast cancer.    History of Present Illness:     64 year old woman had a screening mammogram on 9/29/2023. This showed asymmetry in the superior breast tissue. She had a diagnostic mammogram and US on 10/18/2023. This showed a supsicious mass at the 1:30 o'clock left breast measuring 1.1 x 1.4 x 1.1 cm. She had an US biopsy on 10/18/2023 that showed invasive lobular carcinoma, grade II. The ER was > 95%, MT was 50%, Ki-67 was 22% and the Her2 was 1+ (negative). She had an MRI of the breast on 11/5/2023 that showed a 1.5 x 1.5 x 1.1 cm mass at the region of the biopsy in the left breast. There was no additional suspicious area on the MRI. She proceeded to have lumpectomy and SLN procedure at [facility] with Dr. Uzair Terry on 11/29/2023. This showed a 1.6 cm invasive lobular carcinoma, grade II. There was additional DCIS. The margins were all negative. She had a SLN procedure with 0/7 nodes. The repeat testing had ER %, MT negative, Ki-67 12% and Her2 2+ IHC but FISH not amplified.    She had a CXR on 12/29/2023 that was negative for suspicious lung finding but there was an indeterminate lucent focus in the left proximal humerus.     She has been doing well. She has minimal post op pain. She has no dyspnea or cough. She has no bone pain. She has no fever or sweats. Her energy level is good.    She has seen Dr. Cecil Long at [facility] for medical oncology consultation. The surgical specimen was sent for Oncotype Dx testing. This is pending.    Past Medical History:  Past Medical History:   Diagnosis Date    Cholecystitis with  cholelithiasis 2005    s/p lap teresa    SANTI I (cervical intraepithelial neoplasia I) 2001     s/p LEEP, cryo    Colles' fracture 2009    left wrist    Elbow injury     left, s/p ORIF of the radial head    MVP (mitral valve prolapse)     Tendinitis of right rotator cuff     possible tear s/p subacromial decompression    Varicose veins 2006       Past Surgical History:  Past Surgical History:   Procedure Laterality Date    CHOLECYSTECTOMY      COLONOSCOPY  2015    COLONOSCOPY      COLPOSCOPY, CERVIX INC UPPER/ADJACENT VAGINA      LEEP,cryo    FOOT SURGERY Left     artificial joint placement    LAPAROSCOPIC CHOLECYSTECTOMY  05          x2    OTHER SURGICAL HISTORY  May 2018    Broken elbow repair    REPAIR ROTATOR CUFF,ACUTE  3/03    right subacromial decompression    TONSILLECTOMY      UPPER ARM/ELBOW SURGERY UNLISTED      ORIF left elbow to repair rx of radial head       Family Medical History:  Family History   Problem Relation Age of Onset    Heart Disorder Mother         Cardiomyopathy    Alcohol and Other Disorders Associated Father     Thyroid Disorder Sister     Cancer Brother 65        Oral cancer    Breast Cancer Paternal Cousin Female 55       Gyne History:  OB History    Para Term  AB Living   2 2 0 0 0 0   SAB IAB Ectopic Multiple Live Births   0 0 0 0 0       Psychosocial History:  Social History     Socioeconomic History    Marital status:      Spouse name: Not on file    Number of children: Not on file    Years of education: Not on file    Highest education level: Not on file   Occupational History    Occupation:    Tobacco Use    Smoking status: Never    Smokeless tobacco: Never   Vaping Use    Vaping Use: Never used   Substance and Sexual Activity    Alcohol use: Not Currently     Comment: occasional    Drug use: No    Sexual activity: Not on file   Other Topics Concern     Service Not Asked    Blood  Transfusions Not Asked    Caffeine Concern Yes     Comment: green tea    Occupational Exposure Not Asked    Hobby Hazards Not Asked    Sleep Concern Not Asked    Stress Concern No    Weight Concern No    Special Diet No    Back Care Not Asked    Exercise Yes    Bike Helmet Not Asked    Seat Belt Yes    Self-Exams Not Asked   Social History Narrative    Not on file     Social Determinants of Health     Financial Resource Strain: Not on file   Food Insecurity: Not on file   Transportation Needs: Not on file   Physical Activity: Not on file   Stress: Not on file   Social Connections: Not on file   Housing Stability: Not on file       Allergies:   No Known Allergies    Current Medications:    Current Outpatient Medications:     ESCITALOPRAM 10 MG Oral Tab, TAKE 1 TABLET(10 MG) BY MOUTH DAILY, Disp: 90 tablet, Rfl: 0    Multivitamin Chewtab, ADULT, Oral Chew Tab, Chew 1 tablet by mouth daily., Disp: , Rfl:     LORazepam 0.5 MG Oral Tab, Take 1 tablet (0.5 mg total) by mouth nightly as needed for Anxiety. (Patient not taking: Reported on 1/2/2024), Disp: 15 tablet, Rfl: 0    Review of Systems:    Constitutional: Negative for anorexia, fatigue, fevers, chills, night sweats and weight loss.  Eyes: Negative for visual disturbance, irritation and redness.  Respiratory: Negative for cough, hemoptysis, chest pain, or dyspnea.  Cardiovascular: Negative for angina, orthopnea or palpitations.  Gastrointestinal: Negative for nausea, vomiting, change in bowel habits, diarrhea, constipation and abdominal pain.  Integument/breast: Negative for rash, skin lesions, and pruritus.  Hematologic/lymphatic: Negative for easy bruising, bleeding, and lymphadenopathy.  Musculoskeletal: Negative for myalgias, arthralgias, muscle weakness.  Genitourinary: Negative for dysuria or hematuria  Neurological: Negative for headaches, dizziness, speech problems, gait problems and focal weakness.  Psychiatric: The patient's mood was calm and appropriate  for this visit.    The pertinent positives and negatives were described in the HPI and above. All other systems were negative.      Vital Signs:  Height: 162.3 cm (5' 3.9\") (01/02 0903)  Weight: 64.4 kg (142 lb) (01/02 0903)  BSA (Calculated - sq m): 1.69 sq meters (01/02 0903)  Pulse: 75 (01/02 0903)  BP: 143/83 (01/02 0903)  Temp: 98 °F (36.7 °C) (01/02 0903)  Do Not Use - Resp Rate: --  SpO2: 99 % (01/02 0903)    Physical Examination:    Constitutional: Patient is alert and oriented x 3, not in acute distress.  HEENT:  Oropharynx is clear. Neck is supple.  Eyes: Anicteric sclera. Pink conjunctiva.  Respiratory: Clear to auscultation and percussion. No rales.  No wheezes.  Cardiovascular: Regular rate and rhythm.   Breast: The right breast has no mass or skin lesion. The left breast upper outer quadrant has a healing lumpectomy scar and healing axillary scar. There is no mass or other skin lesion.  Gastrointestinal: Soft, non tender with good bowel sounds.  Extremities: No edema. No calf tenderness.  Neurological: Grossly intact without focal motor or sensory deficit.  Lymphatics: There is no palpable lymphadenopathy throughout in the cervical, supraclavicular, or axillary regions.    Labs reviewed at this visit:  Lab Results   Component Value Date    WBC 7.3 09/15/2023    RBC 4.35 09/15/2023    HGB 12.7 09/15/2023    HCT 40.0 09/15/2023    MCV 92.0 09/15/2023    MCH 29.2 09/15/2023    MCHC 31.8 09/15/2023    RDW 12.2 09/15/2023    .0 09/15/2023     Lab Results   Component Value Date     09/15/2023    K 4.0 09/15/2023     09/15/2023    CO2 26.0 09/15/2023    BUN 15 09/15/2023    CREATSERUM 0.71 09/15/2023    GLU 90 09/15/2023    CA 9.5 09/15/2023    ALKPHO 58 09/15/2023    ALT 28 09/15/2023    AST 20 09/15/2023    BILT 0.5 09/15/2023    ALB 4.1 09/15/2023    TP 7.4 09/15/2023        Radiologic imaging reviewed at this visit:    CXR PA/Lat on 12/29/2023:  COMPARISON: None.     FINDINGS/    IMPRESSION:   Slightly diminished lung volumes with a few areas of minor probable subsegmental atelectasis and/or scar.  No discrete focal airspace consolidation or substantial pleural effusion, though consider follow-up or alternative imaging if concern persists.     Upper normal size cardiac silhouette, though probably accentuated by slightly diminished lung volumes.     Subcentimeter peripherally sclerotic lucent focus left proximal humerus is incompletely evaluated but of doubtful significance.  Scattered chest wall surgical clips.         Assessment/Plan:    Left breast cancer in the upper outer quadrant:  Lumpectomy and SLN procedure at  ([facility]) on 11/29/2023  ILC, grade II measuring 1.6 cm  SLN 0/7  ER %  MS negative  Ki-67 12%  Her2 2+ IHC  Her2 FISH not amplified    The patient has an indeterminate finding on the CXR in the left humerus. This is likely benign but we will get an MRI of the left proximal arm to assess this. Her risk of metastatic disease is low.    I agree with sending the pathology for Oncotype Dx testing. This has been sent on the finally lumpectomy pathology. I had a long discussion about this testing and how we would use this information. If the Oncotype Dx RS is 25 or less, then I would recommend against adjuvant chemotherapy. I would recommend proceeding with adjuvant endocrine therapy. I discussed the option of tamoxifen versus the aromatase inhibitors. I reviewed the side effects of tamoxifen such as thrombosis, uterine cancer, hot flashes. I discussed the risk of the AI's such as hot flashes, arthralgias and decreased bone density. I ordered a Bone Dexa scan that she will schedule.    If the Oncotype Dx RS is greater than 25 then we would consider adjuvant chemotherapy. I had a preliminary discussion about the Taxotere/cyclophosphamide regimen.     The patient will need radiation oncology consultation for left breast radiation discussion.     I will contact her with the  results of the left arm MRI and with the Oncotype Dx results. We will help her get a rad onc consultation.        Scooter Stubbs MD

## 2024-01-02 NOTE — PROGRESS NOTES
Patient is here for consultation for left breast cancer.  She had a routine mammogram with findings that indicated biopsy and this was done here.    She went to [facility] for her surgery.  She is feeling well postop.   She denies fever or shortness of breath.  She has a cough after a cold a few weeks ago.   She is generally active and eats well.      Education Record    Learner:  Patient and Spouse    Disease / Diagnosis: breast cancer     Barriers / Limitations:  None   Comments:    Method:  Discussion   Comments:    General Topics:  Side effects and symptom management   Comments:    Outcome:  Shows understanding   Comments:

## 2024-01-08 ENCOUNTER — NURSE NAVIGATOR ENCOUNTER (OUTPATIENT)
Dept: HEMATOLOGY/ONCOLOGY | Facility: HOSPITAL | Age: 64
End: 2024-01-08

## 2024-01-10 ENCOUNTER — TELEPHONE (OUTPATIENT)
Dept: HEMATOLOGY/ONCOLOGY | Facility: HOSPITAL | Age: 64
End: 2024-01-10

## 2024-01-10 NOTE — TELEPHONE ENCOUNTER
Called patient regarding Oncotype results.  Patient stated her results are 26, she will ask [facility] to fax the report to [place]. Patient states she does not want to do chemo unless it is absolutely necessary.  She has a consultation scheduled with [facility] onc tomorrow to discuss options.  Does want to meet with Dr. Stubbs as well.  Is already scheduled with radiation at [place].

## 2024-01-11 ENCOUNTER — OFFICE VISIT (OUTPATIENT)
Dept: HEMATOLOGY/ONCOLOGY | Age: 64
End: 2024-01-11
Attending: INTERNAL MEDICINE
Payer: COMMERCIAL

## 2024-01-11 VITALS
BODY MASS INDEX: 24.37 KG/M2 | HEART RATE: 73 BPM | RESPIRATION RATE: 18 BRPM | HEIGHT: 63.9 IN | OXYGEN SATURATION: 99 % | TEMPERATURE: 98 F | SYSTOLIC BLOOD PRESSURE: 138 MMHG | WEIGHT: 141 LBS | DIASTOLIC BLOOD PRESSURE: 79 MMHG

## 2024-01-11 DIAGNOSIS — C50.412 MALIGNANT NEOPLASM OF UPPER-OUTER QUADRANT OF LEFT BREAST IN FEMALE, ESTROGEN RECEPTOR POSITIVE  (HCC): Primary | ICD-10-CM

## 2024-01-11 DIAGNOSIS — Z17.0 MALIGNANT NEOPLASM OF UPPER-OUTER QUADRANT OF LEFT BREAST IN FEMALE, ESTROGEN RECEPTOR POSITIVE  (HCC): Primary | ICD-10-CM

## 2024-01-11 PROCEDURE — 99214 OFFICE O/P EST MOD 30 MIN: CPT | Performed by: INTERNAL MEDICINE

## 2024-01-11 NOTE — PROGRESS NOTES
Patient is here for follow up for breast cancer and to discuss her oncotype testing of 26.      Education Record    Learner:  Patient    Disease / Diagnosis: breast cancer    Barriers / Limitations:  None   Comments:    Method:  Discussion   Comments:    General Topics:  Side effects and symptom management   Comments:    Outcome:  Shows understanding   Comments:

## 2024-01-11 NOTE — PROGRESS NOTES
Cancer Center Progress Note    Problem List:      Patient Active Problem List   Diagnosis    Varicose veins    MVP (mitral valve prolapse)    Other specified congenital anomaly of skin    Acquired hallux rigidus of right foot    History of loop electrical excision procedure (LEEP)    Infiltrating ductal carcinoma of upper-outer quadrant of left breast in female (HCC)       Interim History:    Julia Medeiros presents today for evaluation and management of a diagnosis of left breast cancer.    The patient returns after getting the Oncotype Dx results. The recurrence score (RS) is 26. She has had follow up at [facility] with Dr. Cecil Long. He discussed adjuvant chemotherapy. She has no other new complaints. She has no pain.      Review of Systems:   Constitutional: Negative for anorexia, fatigue, fevers, chills, night sweats and weight loss.  Eyes: Negative for visual disturbance, irritation and redness.  Respiratory: Negative for cough, hemoptysis, chest pain, or dyspnea.  Cardiovascular: Negative for angina, orthopnea or palpitations.  Gastrointestinal: Negative for nausea, vomiting, change in bowel habits, diarrhea, constipation and abdominal pain.  Integument/breast: Negative for rash, skin lesions, and pruritus.  Hematologic/lymphatic: Negative for easy bruising, bleeding, and lymphadenopathy.  Musculoskeletal: Negative for myalgias, arthralgias, muscle weakness.  Genitourinary: Negative for dysuria or hematuria  Psychiatric: The patient's mood was calm and appropriate for this visit.  The pertinent positives and negatives were described. All other systems were negative.    PMH/PSH:  Past Medical History:   Diagnosis Date    Cholecystitis with cholelithiasis 07/14/2005    s/p lap teresa    SANTI I (cervical intraepithelial neoplasia I) 08/2001     s/p LEEP, cryo    Colles' fracture 02/2009    left wrist    Elbow injury     left, s/p ORIF of the radial head    MVP (mitral valve prolapse)     Tendinitis of right rotator  cuff 2003    possible tear s/p subacromial decompression    Varicose veins 2006       Past Surgical History:   Procedure Laterality Date    CHOLECYSTECTOMY      COLONOSCOPY  2015    COLONOSCOPY      COLPOSCOPY, CERVIX INC UPPER/ADJACENT VAGINA      LEEP,cryo    FOOT SURGERY Left     artificial joint placement    LAPAROSCOPIC CHOLECYSTECTOMY  05          x2    OTHER SURGICAL HISTORY  May 2018    Broken elbow repair    REPAIR ROTATOR CUFF,ACUTE  3/03    right subacromial decompression    TONSILLECTOMY      UPPER ARM/ELBOW SURGERY UNLISTED      ORIF left elbow to repair rx of radial head       Family History Reviewed:  Family History   Problem Relation Age of Onset    Heart Disorder Mother         Cardiomyopathy    Alcohol and Other Disorders Associated Father     Thyroid Disorder Sister     Cancer Brother 65        Oral cancer    Breast Cancer Paternal Cousin Female 55       Allergies:     No Known Allergies    Medications:   ESCITALOPRAM 10 MG Oral Tab TAKE 1 TABLET(10 MG) BY MOUTH DAILY 90 tablet 0    Multivitamin Chewtab, ADULT, Oral Chew Tab Chew 1 tablet by mouth daily.           Vital Signs:      Height: 162.3 cm (5' 3.9\") (1153)  Weight: 64 kg (141 lb) (1153)  BSA (Calculated - sq m): 1.68 sq meters (1153)  Pulse: 73 (1153)  BP: 138/79 (1153)  Temp: 97.6 °F (36.4 °C) (1153)  Do Not Use - Resp Rate: --  SpO2: 99 % (1153)      Physical Examination:    Constitutional: Patient is alert and oriented x 3, not in acute distress.  Psychiatric: The patient's mood is calm and appropriate for this visit.      Labs reviewed at this visit:     Lab Results   Component Value Date    WBC 7.3 09/15/2023    RBC 4.35 09/15/2023    HGB 12.7 09/15/2023    HCT 40.0 09/15/2023    MCV 92.0 09/15/2023    MCH 29.2 09/15/2023    MCHC 31.8 09/15/2023    RDW 12.2 09/15/2023    .0 09/15/2023     Lab Results   Component Value Date     09/15/2023    K 4.0 09/15/2023      09/15/2023    CO2 26.0 09/15/2023    BUN 15 09/15/2023    CREATSERUM 0.71 09/15/2023    GLU 90 09/15/2023    CA 9.5 09/15/2023    ALKPHO 58 09/15/2023    ALT 28 09/15/2023    AST 20 09/15/2023    BILT 0.5 09/15/2023    ALB 4.1 09/15/2023    TP 7.4 09/15/2023       Radiologic imaging reviewed at this visit:       Assessment/Plan:     Left breast cancer in the upper outer quadrant:  Lumpectomy and SLN procedure at  ([facility]) on 11/29/2023  ILC, grade II measuring 1.6 cm  SLN 0/7  ER %  MT negative  Ki-67 12%  Her2 2+ IHC  Her2 FISH not amplified  Oncotype Dx RS 26     I had a long discussion with the patient about the pathology results and the Oncotype Dx result. I discussed the retrospective data and the prospective data. The prospective randomized data only included patients with an Oncotype dx RU up to a score of 25 and did not show any benefit from the addition of adjuvant systemic chemotherapy. The retrospective data shows that the benefit from adjuvant chemotherapy increases with increasing Oncotype Dx RS. The retrospective data was indeterminate with scores between 26 to 30. She has a small, lymph node negative breast cancer. Clearly the absolute benefit of adjuvant chemotherapy would be very small if any but we cannot exclude that there could be a significant benefit. I reviewed the option of the Taxotere/cyclophosphamide regimen. After this full discussion she was favoring not having chemotherapy. I then discussed the option of endocrine therapy with Letrozole 2.5 mg daily. She understands the risks. We would want a Dexa scan as a baseline. She will think about her options and proceed with radiation. She will contact me if she wants to continue follow up with us or if she will continue with [facility] for her care.    This visit lasted 30 minutes with 25 minutes for face to face discussion of management and 5 minutes for post visit charting.      Scooter Stubbs MD

## 2024-01-16 ENCOUNTER — APPOINTMENT (OUTPATIENT)
Dept: RADIATION ONCOLOGY | Facility: HOSPITAL | Age: 64
End: 2024-01-16
Attending: RADIOLOGY
Payer: COMMERCIAL

## 2024-01-16 ENCOUNTER — OFFICE VISIT (OUTPATIENT)
Dept: INTERNAL MEDICINE CLINIC | Facility: CLINIC | Age: 64
End: 2024-01-16
Payer: COMMERCIAL

## 2024-01-16 VITALS
BODY MASS INDEX: 24.14 KG/M2 | TEMPERATURE: 95 F | OXYGEN SATURATION: 93 % | HEART RATE: 71 BPM | SYSTOLIC BLOOD PRESSURE: 122 MMHG | WEIGHT: 141.38 LBS | DIASTOLIC BLOOD PRESSURE: 72 MMHG | HEIGHT: 64 IN

## 2024-01-16 DIAGNOSIS — F99 INSOMNIA DUE TO OTHER MENTAL DISORDER: ICD-10-CM

## 2024-01-16 DIAGNOSIS — J32.9 BACTERIAL SINUSITIS: Primary | ICD-10-CM

## 2024-01-16 DIAGNOSIS — F51.05 INSOMNIA DUE TO OTHER MENTAL DISORDER: ICD-10-CM

## 2024-01-16 DIAGNOSIS — B96.89 BACTERIAL SINUSITIS: Primary | ICD-10-CM

## 2024-01-16 DIAGNOSIS — F41.8 SITUATIONAL ANXIETY: ICD-10-CM

## 2024-01-16 PROCEDURE — 3074F SYST BP LT 130 MM HG: CPT | Performed by: STUDENT IN AN ORGANIZED HEALTH CARE EDUCATION/TRAINING PROGRAM

## 2024-01-16 PROCEDURE — 99214 OFFICE O/P EST MOD 30 MIN: CPT | Performed by: STUDENT IN AN ORGANIZED HEALTH CARE EDUCATION/TRAINING PROGRAM

## 2024-01-16 PROCEDURE — 3078F DIAST BP <80 MM HG: CPT | Performed by: STUDENT IN AN ORGANIZED HEALTH CARE EDUCATION/TRAINING PROGRAM

## 2024-01-16 PROCEDURE — 3008F BODY MASS INDEX DOCD: CPT | Performed by: STUDENT IN AN ORGANIZED HEALTH CARE EDUCATION/TRAINING PROGRAM

## 2024-01-16 RX ORDER — AMOXICILLIN AND CLAVULANATE POTASSIUM 875; 125 MG/1; MG/1
1 TABLET, FILM COATED ORAL 2 TIMES DAILY
Qty: 10 TABLET | Refills: 0 | Status: SHIPPED | OUTPATIENT
Start: 2024-01-16 | End: 2024-01-21

## 2024-01-16 RX ORDER — MULTIVIT-MIN/IRON/FOLIC ACID/K 18-600-40
1 CAPSULE ORAL DAILY
COMMUNITY

## 2024-01-16 RX ORDER — METHYLPREDNISOLONE 4 MG/1
TABLET ORAL
Qty: 1 EACH | Refills: 0 | Status: SHIPPED | OUTPATIENT
Start: 2024-01-16

## 2024-01-16 RX ORDER — ESCITALOPRAM OXALATE 10 MG/1
10 TABLET ORAL DAILY
Qty: 90 TABLET | Refills: 1 | Status: SHIPPED | OUTPATIENT
Start: 2024-01-16

## 2024-01-16 NOTE — PROGRESS NOTES
Chief Complaint   Patient presents with    Cold     Upper over a month now headaches, coughing ,fatigue   1/15/24 neg for covid      Nasal Congestion     Increase Chest congestion used a nasal spray 1/15/24 helped a little      SUBJECTIVE & A/P:  The patient is a 64 year old F with pmhx of MVP, L ductal  breast cancer s/p lumpectomy, negative sentinal nodes who presents with cough, congestion.     //Bacterial sinusitis   Has had symptoms of cough for last 5 weeks. Now reporting last few days more productive cough with yellow sputum, congestion, and runny nose. Does not have chronic cough at baseline. Does report getting bad coughs after URIs. Does have sick contacts with children and grandchildren. No chemo or immunosuppresive agents. Planning on radiation therapy in 2 weeks for 3 weeks total for breast cancer. No fevers, shortness of breath, chest pain. Noting sore throat.   PLAN:   Less likely strep throat due to cough symptoms as well and no fever. Centor criteria -1. Likely bacterial sinusitis in the setting of new congestion, cough production. Lung exam reassuring without wheezing, rales, or rhonchi. Normal lung exam. Will treat for bacterial sinusitis as below.    -Augmentin BID x 5 days.   -Medrol dose pack x 5 days.   -If symptoms not improved, recommend extending abx and CXR to evaluate for pneumonia. On exam, Lung sounds within normal limits.      //Situational anxiety  PLAN:  -Continue escitalopram 10mg daily.     //Insomnia  PLAN:  -Continue Lorazapam 0.5mg as needed for sleep. Has 9 pills left. Recommend inperson or video evaluation at the time of needing refills. Reports that this medication was prescribed only for the acute setting and she does not plan to continue it after she is done with breast cancer treatment. Risks vs. Benefits discussed. If long-term sleep aid needed after 6 months, will recommend alternative sleep aid.     Return if symptoms worsen or fail to improve, for 3-6 months for  medication check and September for annual physical. .    Josette Pineda MD  Internal Medicine     Past Medical History:   Past Medical History:   Diagnosis Date    Breast cancer (HCC)     diagnosed oct 2023    Cholecystitis with cholelithiasis 2005    s/p lap teresa    SANTI I (cervical intraepithelial neoplasia I) 2001     s/p LEEP, cryo    Colles' fracture 2009    left wrist    Elbow injury     left, s/p ORIF of the radial head    MVP (mitral valve prolapse)     Tendinitis of right rotator cuff     possible tear s/p subacromial decompression    Varicose veins 2006        Past Surgical History:   Past Surgical History:   Procedure Laterality Date    CHOLECYSTECTOMY      COLONOSCOPY  2015    COLONOSCOPY      COLPOSCOPY, CERVIX INC UPPER/ADJACENT VAGINA      LEEP,cryo    FOOT SURGERY Left     artificial joint placement    LAPAROSCOPIC CHOLECYSTECTOMY  2005    LUMPECTOMY LEFT Left     2023          x2    OTHER SURGICAL HISTORY  2018    Broken elbow repair    REPAIR ROTATOR CUFF,ACUTE  2003    right subacromial decompression    TONSILLECTOMY      UPPER ARM/ELBOW SURGERY UNLISTED      ORIF left elbow to repair rx of radial head       Current Medications:    Current Outpatient Medications   Medication Sig Dispense Refill    Cholecalciferol (VITAMIN D) 50 MCG (2000 UT) Oral Cap Take 1 capsule (2,000 Units total) by mouth daily. 2,000 unites two gummy daily      amoxicillin clavulanate 875-125 MG Oral Tab Take 1 tablet by mouth 2 (two) times daily for 5 days. 10 tablet 0    methylPREDNISolone (MEDROL) 4 MG Oral Tablet Therapy Pack As directed. 1 each 0    escitalopram 10 MG Oral Tab Take 1 tablet (10 mg total) by mouth daily. 90 tablet 1    LORazepam 0.5 MG Oral Tab Take 1 tablet (0.5 mg total) by mouth nightly as needed for Anxiety. (Patient taking differently: Take 1 tablet (0.5 mg total) by mouth nightly as needed for Anxiety. As needed) 15 tablet 0    Multivitamin  Chewtab, ADULT, Oral Chew Tab Chew 1 tablet by mouth daily.         PHYSICAL EXAM:   /72 (BP Location: Left arm, Patient Position: Sitting, Cuff Size: adult)   Pulse 71   Temp (!) 94.6 °F (34.8 °C) (Temporal)   Ht 5' 4\" (1.626 m)   Wt 141 lb 6.4 oz (64.1 kg)   SpO2 93%   BMI 24.27 kg/m²  Body mass index is 24.27 kg/m².   General: No acute distress. Alert and oriented x 3.  HEENT: Ear canals clear. TMs pearly gray bilaterally. Throat with erythema no exudates. Nasal tubinate erythematous and edema, discharged noted on examination.   Neck: No lymphadenopathy.  No thyromegaly.   Respiratory: Clear to auscultation bilaterally.  No wheezes, rales, or rhonchi.   Cardiovascular: RRR. No murmurs, rubs, or gallops.   Psychiatric: Appropriate mood and affect.    LABS:   No results found for: \"A1C\"   Lab Results   Component Value Date    GLU 90 09/15/2023    BUN 15 09/15/2023    BUNCREA 18.2 08/23/2018    CREATSERUM 0.71 09/15/2023    ANIONGAP 8 09/15/2023    GFR 96 05/31/2017    GFRNAA 92 07/19/2022    GFRAA 106 07/19/2022    CA 9.5 09/15/2023    OSMOCALC 290 09/15/2023    ALKPHO 58 09/15/2023    AST 20 09/15/2023    ALT 28 09/15/2023    BILT 0.5 09/15/2023    TP 7.4 09/15/2023    ALB 4.1 09/15/2023    GLOBULIN 3.3 09/15/2023     09/15/2023    K 4.0 09/15/2023     09/15/2023    CO2 26.0 09/15/2023      Lab Results   Component Value Date    WBC 7.3 09/15/2023    RBC 4.35 09/15/2023    HGB 12.7 09/15/2023    HCT 40.0 09/15/2023    MCV 92.0 09/15/2023    MCH 29.2 09/15/2023    MCHC 31.8 09/15/2023    RDW 12.2 09/15/2023    .0 09/15/2023        IMAGING: None to review.

## 2024-01-19 ENCOUNTER — PATIENT MESSAGE (OUTPATIENT)
Dept: INTERNAL MEDICINE CLINIC | Facility: CLINIC | Age: 64
End: 2024-01-19

## 2024-01-19 DIAGNOSIS — R05.2 SUBACUTE COUGH: Primary | ICD-10-CM

## 2024-01-19 NOTE — TELEPHONE ENCOUNTER
From: Julia Medeiros  To: Josette Pineda  Sent: 1/19/2024 1:15 PM CST  Subject: Follow up to visit earlier this week    Hi Dr. Pineda- I’ve been on the prednisone and antibiotic for 3-4 days now and my congestion is a bit better but not gone, and now my cough is increasing- it’s prmarily a dry cough, not a productive cough. I’d still prefer to pass on the X-ray unless you think the cough is an indicator I should, but could you suggest anything else- anything to get the cough to go away? Thank you—-Julia

## 2024-01-20 RX ORDER — AMOXICILLIN AND CLAVULANATE POTASSIUM 875; 125 MG/1; MG/1
1 TABLET, FILM COATED ORAL 2 TIMES DAILY
Qty: 10 TABLET | Refills: 0 | Status: SHIPPED | OUTPATIENT
Start: 2024-01-20

## 2024-01-20 RX ORDER — BENZONATATE 200 MG/1
200 CAPSULE ORAL 3 TIMES DAILY PRN
Qty: 30 CAPSULE | Refills: 0 | Status: SHIPPED | OUTPATIENT
Start: 2024-01-20

## 2024-01-29 ENCOUNTER — OFFICE VISIT (OUTPATIENT)
Dept: INTERNAL MEDICINE CLINIC | Facility: CLINIC | Age: 64
End: 2024-01-29
Payer: COMMERCIAL

## 2024-01-29 ENCOUNTER — HOSPITAL ENCOUNTER (OUTPATIENT)
Dept: GENERAL RADIOLOGY | Age: 64
Discharge: HOME | End: 2024-01-29
Attending: STUDENT IN AN ORGANIZED HEALTH CARE EDUCATION/TRAINING PROGRAM
Payer: COMMERCIAL

## 2024-01-29 VITALS
HEIGHT: 64 IN | WEIGHT: 142 LBS | OXYGEN SATURATION: 96 % | HEART RATE: 83 BPM | SYSTOLIC BLOOD PRESSURE: 120 MMHG | TEMPERATURE: 97 F | BODY MASS INDEX: 24.24 KG/M2 | DIASTOLIC BLOOD PRESSURE: 72 MMHG

## 2024-01-29 DIAGNOSIS — H69.93 EUSTACHIAN TUBE DYSFUNCTION, BILATERAL: ICD-10-CM

## 2024-01-29 DIAGNOSIS — B96.89 BACTERIAL SINUSITIS: Primary | ICD-10-CM

## 2024-01-29 DIAGNOSIS — J32.9 BACTERIAL SINUSITIS: Primary | ICD-10-CM

## 2024-01-29 DIAGNOSIS — J32.9 BACTERIAL SINUSITIS: ICD-10-CM

## 2024-01-29 DIAGNOSIS — B96.89 BACTERIAL SINUSITIS: ICD-10-CM

## 2024-01-29 PROCEDURE — 3074F SYST BP LT 130 MM HG: CPT | Performed by: STUDENT IN AN ORGANIZED HEALTH CARE EDUCATION/TRAINING PROGRAM

## 2024-01-29 PROCEDURE — 3008F BODY MASS INDEX DOCD: CPT | Performed by: STUDENT IN AN ORGANIZED HEALTH CARE EDUCATION/TRAINING PROGRAM

## 2024-01-29 PROCEDURE — 3078F DIAST BP <80 MM HG: CPT | Performed by: STUDENT IN AN ORGANIZED HEALTH CARE EDUCATION/TRAINING PROGRAM

## 2024-01-29 PROCEDURE — 71046 X-RAY EXAM CHEST 2 VIEWS: CPT | Performed by: STUDENT IN AN ORGANIZED HEALTH CARE EDUCATION/TRAINING PROGRAM

## 2024-01-29 PROCEDURE — 99213 OFFICE O/P EST LOW 20 MIN: CPT | Performed by: STUDENT IN AN ORGANIZED HEALTH CARE EDUCATION/TRAINING PROGRAM

## 2024-01-29 RX ORDER — AMOXICILLIN AND CLAVULANATE POTASSIUM 875; 125 MG/1; MG/1
1 TABLET, FILM COATED ORAL 2 TIMES DAILY
Qty: 14 TABLET | Refills: 0 | Status: SHIPPED | OUTPATIENT
Start: 2024-01-29 | End: 2024-02-05

## 2024-01-29 RX ORDER — AMOXICILLIN AND CLAVULANATE POTASSIUM 875; 125 MG/1; MG/1
1 TABLET, FILM COATED ORAL 2 TIMES DAILY
Qty: 14 TABLET | Refills: 0 | Status: SHIPPED | OUTPATIENT
Start: 2024-01-29 | End: 2024-01-29

## 2024-01-29 RX ORDER — ANASTROZOLE 1 MG/1
1 TABLET ORAL DAILY
COMMUNITY
Start: 2024-01-11

## 2024-01-29 NOTE — PATIENT INSTRUCTIONS
Ear pain - Flonase 2 puffs in each nose twice a day. If not better in 3-5 days call.     Suspect pneumonia vs. Sinus infection - CXR and if positive will order two abx. If negative will order augmentin alone.

## 2024-01-29 NOTE — PROGRESS NOTES
Chief Complaint   Patient presents with    Cough     Phone message 1/29/24  due to cough  congestion headache sore throat  antibiotic/prednisone after the 4-5 days came back         SUBJECTIVE & A/P:  The patient is a 64 year old F with pmhx of MVP, L ductal  breast cancer s/p lumpectomy, negative sentinal nodes who presents with cough, congestion persistent after antibiotic therapy.     //Viral URI vs. Bacterial pneumonia vs. Bacterial sinusitis  1/16 - Diagnosed with bacterial sinusitis. Treated with Augmentin x 5 days. Extended course as she was not feeling completely better. Completed abx on 1/25. Reported that she might have felt better for 4 days initially, but after further discussion, reporting that her symptoms started back up on Saturday so she may not have felt better after abx treatment. Symptoms actually started 5 weeks prior to 1/16 and persisted. At the time of evaluation on 1/16, recommended CXR to evaluation for bacterial pneumonia as that would change what abx we treat with. Patient now reporting productive sputum, cough, congestion, nasal drainage. Yellow sputum production. No fevers reported. No chest pain or shortness of breath. Does have 3 year old grandson that visits often.   PLAN:   Suspect viral URI vs. Bacterial etiology. Patient has been persistently sick for last 6 weeks with productive sputum. Despite antibiotic therapy, she may have had a day or two of symptom resolution as most and symptoms started again. Could be another viral URI, but concern for bacterial etiology as patient did report some improvement with abx. Will order further imaging prior to abx therapy. If positive for pneumonia will treat with augmentin and azithromycin. If negative will do Augmentin alone for additional 7 days as reporting improvement in symptoms while on Augmentin. Discussed that I would recommend delay in radiation therapy while acutely ill due to impact of radiation on overall strength. She would not  like to delay radiation at this time. Recommend further discussion regarding plan for radiation tomorrow with radiation oncologist.   -CXR ordered.   -Plan for Augmentin vs. Augmentin and z-pack.   -Recommend she further discusses regarding plan for radiation tomorrow with radiation oncologist.     //Eustachian tube dysfunction  Reporting bilaterally ear pain and fullness.   PLAN:  Clear fluid visualized behind TM bilaterally. No redness noted. Some bulging of TM noted. Suspect eustachian tube dysfunction.   -Flonase 2 puffs each nostril BID   -If not better in 3-4 days, plan for steroid taper.     Return if symptoms worsen or fail to improve.    Josette Pineda MD  Internal Medicine     Past Medical History:   Past Medical History:   Diagnosis Date    Breast cancer (HCC)     diagnosed oct 2023    Cholecystitis with cholelithiasis 2005    s/p lap teresa    SANTI I (cervical intraepithelial neoplasia I) 2001     s/p LEEP, cryo    Colles' fracture 2009    left wrist    Elbow injury     left, s/p ORIF of the radial head    MVP (mitral valve prolapse)     Tendinitis of right rotator cuff     possible tear s/p subacromial decompression    Varicose veins 2006        Past Surgical History:   Past Surgical History:   Procedure Laterality Date    CHOLECYSTECTOMY      COLONOSCOPY  2015    COLONOSCOPY      COLPOSCOPY, CERVIX INC UPPER/ADJACENT VAGINA      LEEP,cryo    FOOT SURGERY Left     artificial joint placement    LAPAROSCOPIC CHOLECYSTECTOMY  2005    LUMPECTOMY LEFT Left     2023          x2    OTHER SURGICAL HISTORY  2018    Broken elbow repair    REPAIR ROTATOR CUFF,ACUTE  2003    right subacromial decompression    TONSILLECTOMY      UPPER ARM/ELBOW SURGERY UNLISTED      ORIF left elbow to repair rx of radial head       Current Medications:    Current Outpatient Medications   Medication Sig Dispense Refill    anastrozole 1 MG Oral Tab tab Take 1 tablet (1 mg total) by  mouth daily. After radiation      benzonatate 200 MG Oral Cap Take 1 capsule (200 mg total) by mouth 3 (three) times daily as needed. 30 capsule 0    Cholecalciferol (VITAMIN D) 50 MCG (2000 UT) Oral Cap Take 1 capsule (2,000 Units total) by mouth daily. 2,000 unites two gummy daily      escitalopram 10 MG Oral Tab Take 1 tablet (10 mg total) by mouth daily. 90 tablet 1    LORazepam 0.5 MG Oral Tab Take 1 tablet (0.5 mg total) by mouth nightly as needed for Anxiety. (Patient taking differently: Take 1 tablet (0.5 mg total) by mouth nightly as needed for Anxiety. As needed) 15 tablet 0    Multivitamin Chewtab, ADULT, Oral Chew Tab Chew 1 tablet by mouth daily.         PHYSICAL EXAM:   /72 (BP Location: Right arm, Patient Position: Sitting, Cuff Size: large)   Pulse 83   Temp 97.2 °F (36.2 °C) (Temporal)   Ht 5' 4\" (1.626 m)   Wt 142 lb (64.4 kg)   SpO2 96%   BMI 24.37 kg/m²  Body mass index is 24.37 kg/m².   General: No acute distress. Alert and oriented x 3.  HEENT: Ear canals clear. TMs pearly gray bilaterally. Bulging with clear fluid and bubbles behind TM. Throat with erythema, no exudates.  Neck: No lymphadenopathy.  No thyromegaly.   Respiratory: Clear to auscultation bilaterally.  No wheezes, rales, or rhonchi.   Cardiovascular: RRR. No murmurs, rubs, or gallops.   Psychiatric: Appropriate mood and affect.    LABS:   No results found for: \"A1C\"   Lab Results   Component Value Date    GLU 90 09/15/2023    BUN 15 09/15/2023    BUNCREA 18.2 08/23/2018    CREATSERUM 0.71 09/15/2023    ANIONGAP 8 09/15/2023    GFR 96 05/31/2017    GFRNAA 92 07/19/2022    GFRAA 106 07/19/2022    CA 9.5 09/15/2023    OSMOCALC 290 09/15/2023    ALKPHO 58 09/15/2023    AST 20 09/15/2023    ALT 28 09/15/2023    BILT 0.5 09/15/2023    TP 7.4 09/15/2023    ALB 4.1 09/15/2023    GLOBULIN 3.3 09/15/2023     09/15/2023    K 4.0 09/15/2023     09/15/2023    CO2 26.0 09/15/2023      Lab Results   Component Value Date     WBC 7.3 09/15/2023    RBC 4.35 09/15/2023    HGB 12.7 09/15/2023    HCT 40.0 09/15/2023    MCV 92.0 09/15/2023    MCH 29.2 09/15/2023    MCHC 31.8 09/15/2023    RDW 12.2 09/15/2023    .0 09/15/2023        IMAGING: None to review.

## 2024-08-11 DIAGNOSIS — F41.8 SITUATIONAL ANXIETY: ICD-10-CM

## 2024-08-13 RX ORDER — ESCITALOPRAM OXALATE 10 MG/1
10 TABLET ORAL DAILY
Qty: 90 TABLET | Refills: 3 | Status: SHIPPED | OUTPATIENT
Start: 2024-08-13

## 2024-08-13 NOTE — TELEPHONE ENCOUNTER
1. Situational anxiety  -VESNA-7: 18, CSSR: No risk.   -She was on lexapro years ago and tolerated well  -Start Lexapro 10 mg every day,     The patient is asked to return as needed or when routine care is due.     Requested Prescriptions     Pending Prescriptions Disp Refills    ESCITALOPRAM 10 MG Oral Tab [Pharmacy Med Name: ESCITALOPRAM 10MG TABLETS] 90 tablet 1     Sig: TAKE 1 TABLET(10 MG) BY MOUTH DAILY     Last OV relevant to medication: 10/23/23  Last refill date: 1/16/24 #/refills: 90/1  When pt was asked to return for OV: as needed  Upcoming appt/reason: No future OV's scheduled  Was pt informed of any over due labs: none  Lab Results   Component Value Date    WBC 7.3 09/15/2023    RBC 4.35 09/15/2023    HGB 12.7 09/15/2023    HCT 40.0 09/15/2023    .0 09/15/2023    MCV 92.0 09/15/2023    MCH 29.2 09/15/2023    MCHC 31.8 09/15/2023    RDW 12.2 09/15/2023    NEPRELIM 3.81 09/15/2023    NEPERCENT 52.5 09/15/2023    LYPERCENT 33.2 09/15/2023    MOPERCENT 9.2 09/15/2023    EOPERCENT 4.0 09/15/2023    BAPERCENT 0.8 09/15/2023    NE 3.81 09/15/2023    LYMABS 2.41 09/15/2023    MOABSO 0.67 09/15/2023    EOABSO 0.29 09/15/2023    BAABSO 0.06 09/15/2023       Lab Results   Component Value Date    GLU 90 09/15/2023    BUN 15 09/15/2023    BUNCREA 18.2 08/23/2018    CREATSERUM 0.71 09/15/2023    ANIONGAP 8 09/15/2023    GFR 96 05/31/2017    GFRNAA 92 07/19/2022    GFRAA 106 07/19/2022    CA 9.5 09/15/2023    OSMOCALC 290 09/15/2023    ALKPHO 58 09/15/2023    AST 20 09/15/2023    ALT 28 09/15/2023    BILT 0.5 09/15/2023    TP 7.4 09/15/2023    ALB 4.1 09/15/2023    GLOBULIN 3.3 09/15/2023     09/15/2023    K 4.0 09/15/2023     09/15/2023    CO2 26.0 09/15/2023       Lab Results   Component Value Date    CHOLEST 197 09/15/2023    TRIG 72 09/15/2023    HDL 60 (H) 09/15/2023     (H) 09/15/2023    VLDL 13 09/15/2023    TCHDLRATIO 2.90 03/30/2021    NONHDLC 137 (H) 09/15/2023     FREE T4 (ng/dL)    Date Value   02/27/2014 0.8 (L)     TSH (mIU/mL)   Date Value   09/15/2023 2.580   03/30/2021 4.870   02/27/2014 1.960

## 2024-11-08 ENCOUNTER — OFFICE VISIT (OUTPATIENT)
Dept: INTERNAL MEDICINE CLINIC | Facility: CLINIC | Age: 64
End: 2024-11-08
Payer: COMMERCIAL

## 2024-11-08 VITALS
RESPIRATION RATE: 20 BRPM | BODY MASS INDEX: 22.88 KG/M2 | HEART RATE: 88 BPM | DIASTOLIC BLOOD PRESSURE: 80 MMHG | OXYGEN SATURATION: 99 % | HEIGHT: 64 IN | SYSTOLIC BLOOD PRESSURE: 122 MMHG | WEIGHT: 134 LBS | TEMPERATURE: 98 F

## 2024-11-08 DIAGNOSIS — Z13.29 SCREENING FOR ENDOCRINE, METABOLIC AND IMMUNITY DISORDER: ICD-10-CM

## 2024-11-08 DIAGNOSIS — H02.402 PTOSIS OF LEFT EYELID: ICD-10-CM

## 2024-11-08 DIAGNOSIS — Z13.0 SCREENING FOR DEFICIENCY ANEMIA: ICD-10-CM

## 2024-11-08 DIAGNOSIS — Z13.220 SCREENING FOR LIPID DISORDERS: ICD-10-CM

## 2024-11-08 DIAGNOSIS — Z23 NEED FOR VACCINATION: Primary | ICD-10-CM

## 2024-11-08 DIAGNOSIS — I34.0 MILD MITRAL REGURGITATION: ICD-10-CM

## 2024-11-08 DIAGNOSIS — Z13.228 SCREENING FOR ENDOCRINE, METABOLIC AND IMMUNITY DISORDER: ICD-10-CM

## 2024-11-08 DIAGNOSIS — Z13.0 SCREENING FOR ENDOCRINE, METABOLIC AND IMMUNITY DISORDER: ICD-10-CM

## 2024-11-08 DIAGNOSIS — M85.80 OSTEOPENIA, UNSPECIFIED LOCATION: ICD-10-CM

## 2024-11-08 PROCEDURE — 90471 IMMUNIZATION ADMIN: CPT | Performed by: STUDENT IN AN ORGANIZED HEALTH CARE EDUCATION/TRAINING PROGRAM

## 2024-11-08 PROCEDURE — 90662 IIV NO PRSV INCREASED AG IM: CPT | Performed by: STUDENT IN AN ORGANIZED HEALTH CARE EDUCATION/TRAINING PROGRAM

## 2024-11-08 PROCEDURE — 99213 OFFICE O/P EST LOW 20 MIN: CPT | Performed by: STUDENT IN AN ORGANIZED HEALTH CARE EDUCATION/TRAINING PROGRAM

## 2024-11-08 PROCEDURE — 99397 PER PM REEVAL EST PAT 65+ YR: CPT | Performed by: STUDENT IN AN ORGANIZED HEALTH CARE EDUCATION/TRAINING PROGRAM

## 2024-11-08 RX ORDER — TRAZODONE HYDROCHLORIDE 50 MG/1
50 TABLET, FILM COATED ORAL NIGHTLY PRN
Qty: 30 TABLET | Refills: 0 | Status: SHIPPED | OUTPATIENT
Start: 2024-11-08

## 2024-11-08 NOTE — PROGRESS NOTES
CHIEF COMPLAINT:   Chief Complaint   Patient presents with    Routine Physical     8/22 Pap neg Hpv, 10/4/24 Mammo,4/ 8/15 Colon 10 yrs repeat.        HPI , Assessment & Plan:   Julia Medeiros is a 64 year old female with pmhx of MVP, L ductal  breast cancer s/p lumpectomy, negative sentinal nodes for a complete physical exam.    Wt Readings from Last 6 Encounters:   11/08/24 134 lb (60.8 kg)   01/29/24 142 lb (64.4 kg)   01/16/24 141 lb 6.4 oz (64.1 kg)   01/11/24 141 lb (64 kg)   01/02/24 142 lb (64.4 kg)   10/27/23 143 lb (64.9 kg)     Body mass index is 23 kg/m².     //L eye lid droop   Noting on going for a few years now, but especially worsening lately. Denies drooping worsening throughout the day. Denies any numbness or tingling in the face or facial droop. She has seen ophthalmologist who recommended oculoplastics.   PLAN:   Neuro exam is benign. If worsening, paired with other neurological limitations, plan for mri brain.   -Refer to oculoplastic - Dr. Tej Pineda.     //Osteopenia   PLAN:   Preventative zometa due to anastrozole. Planning for 2 years of zometa. Will need repeat DEXA scan in 2 years. DEXA 2/2024 - ostepenia in hip and spine  -Daily vitamin D supplemenation 2000 units daily.   -Discussed adequate calcium intake.     //Insomnia   Tried lorazepam without benefit. Has tried advil PM only takes one which does helps once a week. She is looking for sleep aid, but not something that is addictive as she does not need it every day.   PLAN:   MedHx: lorazepam did not help  -Trazodone 25-50mg nightly PRN.     //Anxiety   Kathy symptoms when tapering off last time. Would like to taper off in April when she retires. Feels work is where some of her anxiety arises from and would like to continue medication up until then.   PLAN:   -Escitalopram 10mg daily   -Tapering off in April after she retires. Plan for 5mg daily x 7 days. Then stop. Patient will update in April when she decides.     //Infiltrating  ductal carcinoma of upper outer quadrant of L breast s/p lumpectomy, SLNBx, and radiation  S/p radiation. On anastrozole. [facility] with [facility] at [facility].   PLAN:   ER+, NV-. HER2+. Forgo adjuvant chemo, completed radiation 2/2024. Now on adjuvant anstrazole 2/2024.   Plan for anastrozole for 5-10 years.   PPX: zometa.  Needs annual diagnostic mmogram   Needs annual pelvic exam - deferred.   Needs to be uptodate on pap smear - deferred.     //Mild mitral regurgitation likely secondary to MVP  Denies chest pain, shortness of breath, dizziness, lightheadedness, leg swelling.   PLAN:   Last echo in 2019 with evidence of mild prolapse involving the anterior leaflet and the posterior leaflet with mild regurgitation.   -Repeat echo ordered.     //Hx of Cervical dysplasia s/p LEEP  PLAN:   Continue pap smear screening due to hx of breast cancer.     Retiring in April. Works for [employer].     HEALTH MAINTENANCE:   -Vaccinations: Prevnar UTD, Shingrix UTD, Flu UTD, COVID Due, TdAP UTD  -Colonoscopy: 04/08/2015 Due 4/2025, she is to continue screening.   -Mammogram: 10/04/2024  -Pap Smear: 07/08/2022 Due - continue screenings due to breast cancer hx.   -Diabetes screening: Last A1c value was  % done  .  -Lipid screening: Cholesterol: 197, done on 9/15/2023.  HDL Cholesterol: 60, done on 9/15/2023.  TriGlycerides 72, done on 9/15/2023.  LDL Cholesterol: 124, done on 9/15/2023.   -Birth Control: Post-menopausal   -Smoking: Denies  -Alcohol: Rare events now. Stopped when she got diagnosed with breast cancer.   -Marijuana: Denies  -Recreational drug: Denies    No follow-ups on file.    Josette Pineda MD   Internal Medicine     Current Outpatient Medications   Medication Sig Dispense Refill    escitalopram 10 MG Oral Tab TAKE 1 TABLET(10 MG) BY MOUTH DAILY 90 tablet 3    anastrozole 1 MG Oral Tab tab Take 1 tablet (1 mg total) by mouth daily. After radiation      Cholecalciferol (VITAMIN D) 50 MCG (2000 UT) Oral Cap Take 1  capsule (2,000 Units total) by mouth daily. 2,000 unites two gummy daily      Multivitamin Chewtab, ADULT, Oral Chew Tab Chew 1 tablet by mouth daily.        Past Medical History:    Breast cancer (HCC)    diagnosed oct 2023    Cholecystitis with cholelithiasis    s/p lap teresa    SANTI I (cervical intraepithelial neoplasia I)     s/p LEEP, cryo    Colles' fracture    left wrist    Elbow injury    left, s/p ORIF of the radial head    MVP (mitral valve prolapse)    Tendinitis of right rotator cuff    possible tear s/p subacromial decompression    Varicose veins      Past Surgical History:   Procedure Laterality Date    Cholecystectomy      Colonoscopy  2015    Colonoscopy      Colposcopy, cervix inc upper/adjacent vagina      LEEP,cryo    Foot surgery Left     artificial joint placement    Laparoscopic cholecystectomy  2005    Lumpectomy left Left     2023          x2    Other surgical history  2018    Broken elbow repair    Repair rotator cuff,acute  2003    right subacromial decompression    Tonsillectomy      Upper arm/elbow surgery unlisted      ORIF left elbow to repair rx of radial head      Family History   Problem Relation Age of Onset    Heart Disorder Mother         Cardiomyopathy    Alcohol and Other Disorders Associated Father     Thyroid Disorder Sister     Cancer Brother 65        Oral cancer    Breast Cancer Paternal Cousin Female 55      Social History:   Social History     Socioeconomic History    Marital status:    Occupational History    Occupation:    Tobacco Use    Smoking status: Never    Smokeless tobacco: Never   Vaping Use    Vaping status: Never Used   Substance and Sexual Activity    Alcohol use: Not Currently     Comment: occasional    Drug use: No   Other Topics Concern    Caffeine Concern Yes     Comment: green tea    Stress Concern No    Weight Concern No    Special Diet No    Exercise Yes    Seat Belt Yes     Occ:  works for [employer]. : yes. Children: yes.   Exercise: none.  Diet: doesn't watch     REVIEW OF SYSTEMS:   Negative except for what is mentioned in HPI.     Screenings:   1. Little interest or pleasure in doing things: Several days  2. Feeling down, depressed, or hopeless: Not at all  3.    4.    5.    6.    7.    8.    9.               EXAM:   /80 (BP Location: Right arm, Patient Position: Sitting, Cuff Size: adult)   Pulse 88   Temp 97.8 °F (36.6 °C) (Temporal)   Resp 20   Ht 5' 4\" (1.626 m)   Wt 134 lb (60.8 kg)   SpO2 99%   BMI 23.00 kg/m²   Body mass index is 23 kg/m².   GENERAL: well developed, well nourished,in no apparent distress  SKIN: no rashes,no suspicious lesions  HEENT: atraumatic, normocephalic,ears and throat are clear  EYES:PERRLA, conjunctiva are clear  NECK: supple,no adenopathy,no bruits  CHEST: no chest tenderness  LUNGS: clear to auscultation  CARDIO: nl s1 and s2, RRR without murmur  GI: good BS's,no masses, HSM or tenderness  BREAST: Deferred  GENITAL/URINARY: Deferred  MUSCULOSKELETAL: back is not tender,FROM of the back  EXTREMITIES: no cyanosis, clubbing or edema=  NEURO: Oriented times three,cranial nerves are intact,motor and sensory are grossly intact. L lid notable to be slightly lower than right. When raising eyebrows to open eyes, noting that the L eyebrow is being recruited as well to help raise the eyelid. No nystagus, double vision. EOM intact. No conjunctivitis.     Labs:   Lab Results   Component Value Date/Time    WBC 7.3 09/15/2023 10:09 AM    HGB 12.7 09/15/2023 10:09 AM    .0 09/15/2023 10:09 AM      Lab Results   Component Value Date/Time    GLU 90 09/15/2023 10:09 AM     09/15/2023 10:09 AM    K 4.0 09/15/2023 10:09 AM     09/15/2023 10:09 AM    CO2 26.0 09/15/2023 10:09 AM    CREATSERUM 0.71 09/15/2023 10:09 AM    CA 9.5 09/15/2023 10:09 AM    ALB 4.1 09/15/2023 10:09 AM    TP 7.4 09/15/2023 10:09 AM    ALKPHO 58 09/15/2023 10:09 AM     AST 20 09/15/2023 10:09 AM    ALT 28 09/15/2023 10:09 AM    BILT 0.5 09/15/2023 10:09 AM    TSH 2.580 09/15/2023 10:09 AM    T4F 0.8 (L) 02/27/2014 09:56 AM        Lab Results   Component Value Date/Time    CHOLEST 197 09/15/2023 10:09 AM    HDL 60 (H) 09/15/2023 10:09 AM    TRIG 72 09/15/2023 10:09 AM     (H) 09/15/2023 10:09 AM    NONHDLC 137 (H) 09/15/2023 10:09 AM       No results found for: \"A1C\"   Vitamin D:  ordered  No results found for: \"VITD\"      Imaging:   No results found.

## 2024-11-21 ENCOUNTER — LAB ENCOUNTER (OUTPATIENT)
Dept: LAB | Age: 64
End: 2024-11-21
Attending: STUDENT IN AN ORGANIZED HEALTH CARE EDUCATION/TRAINING PROGRAM
Payer: COMMERCIAL

## 2024-11-21 DIAGNOSIS — M85.80 OSTEOPENIA, UNSPECIFIED LOCATION: ICD-10-CM

## 2024-11-21 DIAGNOSIS — Z13.0 SCREENING FOR ENDOCRINE, METABOLIC AND IMMUNITY DISORDER: ICD-10-CM

## 2024-11-21 DIAGNOSIS — Z13.220 SCREENING FOR LIPID DISORDERS: ICD-10-CM

## 2024-11-21 DIAGNOSIS — Z13.29 SCREENING FOR ENDOCRINE, METABOLIC AND IMMUNITY DISORDER: ICD-10-CM

## 2024-11-21 DIAGNOSIS — Z13.0 SCREENING FOR DEFICIENCY ANEMIA: ICD-10-CM

## 2024-11-21 DIAGNOSIS — Z13.228 SCREENING FOR ENDOCRINE, METABOLIC AND IMMUNITY DISORDER: ICD-10-CM

## 2024-11-21 PROBLEM — E78.49 OTHER HYPERLIPIDEMIA: Status: ACTIVE | Noted: 2024-11-21

## 2024-11-21 LAB
BASOPHILS # BLD AUTO: 0.02 X10(3) UL (ref 0–0.2)
BASOPHILS NFR BLD AUTO: 0.4 %
CHOLEST SERPL-MCNC: 199 MG/DL (ref ?–200)
EOSINOPHIL # BLD AUTO: 0.21 X10(3) UL (ref 0–0.7)
EOSINOPHIL NFR BLD AUTO: 4.5 %
ERYTHROCYTE [DISTWIDTH] IN BLOOD BY AUTOMATED COUNT: 12.5 %
EST. AVERAGE GLUCOSE BLD GHB EST-MCNC: 120 MG/DL (ref 68–126)
FASTING PATIENT LIPID ANSWER: YES
HBA1C MFR BLD: 5.8 % (ref ?–5.7)
HCT VFR BLD AUTO: 37 %
HDLC SERPL-MCNC: 68 MG/DL (ref 40–59)
HGB BLD-MCNC: 12.4 G/DL
IMM GRANULOCYTES # BLD AUTO: 0.02 X10(3) UL (ref 0–1)
IMM GRANULOCYTES NFR BLD: 0.4 %
LDLC SERPL CALC-MCNC: 122 MG/DL (ref ?–100)
LYMPHOCYTES # BLD AUTO: 1.25 X10(3) UL (ref 1–4)
LYMPHOCYTES NFR BLD AUTO: 26.5 %
MCH RBC QN AUTO: 29.9 PG (ref 26–34)
MCHC RBC AUTO-ENTMCNC: 33.5 G/DL (ref 31–37)
MCV RBC AUTO: 89.2 FL
MONOCYTES # BLD AUTO: 0.42 X10(3) UL (ref 0.1–1)
MONOCYTES NFR BLD AUTO: 8.9 %
NEUTROPHILS # BLD AUTO: 2.79 X10 (3) UL (ref 1.5–7.7)
NEUTROPHILS # BLD AUTO: 2.79 X10(3) UL (ref 1.5–7.7)
NEUTROPHILS NFR BLD AUTO: 59.3 %
NONHDLC SERPL-MCNC: 131 MG/DL (ref ?–130)
PLATELET # BLD AUTO: 225 10(3)UL (ref 150–450)
RBC # BLD AUTO: 4.15 X10(6)UL
TRIGL SERPL-MCNC: 46 MG/DL (ref 30–149)
TSI SER-ACNC: 3.73 UIU/ML (ref 0.55–4.78)
VIT D+METAB SERPL-MCNC: 47.2 NG/ML (ref 30–100)
VLDLC SERPL CALC-MCNC: 8 MG/DL (ref 0–30)
WBC # BLD AUTO: 4.7 X10(3) UL (ref 4–11)

## 2024-11-21 PROCEDURE — 82306 VITAMIN D 25 HYDROXY: CPT

## 2024-11-21 PROCEDURE — 80061 LIPID PANEL: CPT

## 2024-11-21 PROCEDURE — 83036 HEMOGLOBIN GLYCOSYLATED A1C: CPT

## 2024-11-21 PROCEDURE — 85025 COMPLETE CBC W/AUTO DIFF WBC: CPT

## 2024-11-21 PROCEDURE — 84443 ASSAY THYROID STIM HORMONE: CPT

## 2024-11-21 PROCEDURE — 36415 COLL VENOUS BLD VENIPUNCTURE: CPT

## 2024-11-21 NOTE — PROGRESS NOTES
//HLD   Continue to monitor cholesterol levels at this time. Recommend strict dietary and exercise control of statin. ASCVD risk score at this time is <7%. Medication is not indicated for prevention. No hx of CVD. CTM.  The 10-year ASCVD risk score (Benigno GONZALEZ, et al., 2019) is: 5.2%    Values used to calculate the score:      Age: 64 years      Sex: Female      Is Non- : No      Diabetic: No      Tobacco smoker: No      Systolic Blood Pressure: 122 mmHg      Is BP treated: No      HDL Cholesterol: 68 mg/dL      Total Cholesterol: 199 mg/dL

## 2024-12-02 ENCOUNTER — HOSPITAL ENCOUNTER (OUTPATIENT)
Dept: CV DIAGNOSTICS | Facility: HOSPITAL | Age: 64
Discharge: HOME | End: 2024-12-02
Attending: STUDENT IN AN ORGANIZED HEALTH CARE EDUCATION/TRAINING PROGRAM
Payer: COMMERCIAL

## 2024-12-02 DIAGNOSIS — I34.0 MILD MITRAL REGURGITATION: ICD-10-CM

## 2024-12-02 PROCEDURE — 93306 TTE W/DOPPLER COMPLETE: CPT | Performed by: STUDENT IN AN ORGANIZED HEALTH CARE EDUCATION/TRAINING PROGRAM

## 2024-12-06 NOTE — TELEPHONE ENCOUNTER
Last OV relevant to medication: 11/18/24  Last refill date: 11/8/24 #30/refills: 0  When pt was asked to return for OV: 11/8/25  Upcoming appt/reason:   Future Appointments   Date Time Provider Department Center   4/11/2025  7:00 AM Ruben Haro MD [facility] SUB GI   Was pt informed of any over due labs: utd

## 2024-12-06 NOTE — TELEPHONE ENCOUNTER
Can we confirm with the patient that she is using this and if she is tolerating it well and using it every night? It was a new medication we started.

## 2024-12-09 RX ORDER — TRAZODONE HYDROCHLORIDE 50 MG/1
50 TABLET, FILM COATED ORAL NIGHTLY PRN
Qty: 30 TABLET | Refills: 0 | Status: SHIPPED | OUTPATIENT
Start: 2024-12-09

## 2024-12-09 NOTE — TELEPHONE ENCOUNTER
Per pt:I have used it with no side effects. I don’t use it every night - maybe once or twice a week. One more refill should hold me for quite some time.

## 2025-04-11 PROBLEM — Z12.11 SPECIAL SCREENING FOR MALIGNANT NEOPLASMS, COLON: Status: ACTIVE | Noted: 2025-04-11

## 2025-05-06 ENCOUNTER — TELEPHONE (OUTPATIENT)
Dept: INTERNAL MEDICINE CLINIC | Facility: CLINIC | Age: 65
End: 2025-05-06

## 2025-05-06 NOTE — TELEPHONE ENCOUNTER
Date of pre-op appt:  5/13/25  Provider pre-op scheduled with: Dr Pineda   Surgeon's name:  Dr Pineda    Phone #:  [phone]   Fax #:  [phone]  Surgery date:  5/30/25  Procedure/diagnosis:  ?  Does patient routinely see Cardiology or Pulmonology?   If yes, please inform Pt that they may need clearance from them also    Pre op form given to: Luann  Pre op faxed to Dr Pineda

## 2025-05-13 ENCOUNTER — OFFICE VISIT (OUTPATIENT)
Dept: INTERNAL MEDICINE CLINIC | Facility: CLINIC | Age: 65
End: 2025-05-13
Payer: COMMERCIAL

## 2025-05-13 ENCOUNTER — LAB ENCOUNTER (OUTPATIENT)
Dept: LAB | Age: 65
End: 2025-05-13
Attending: STUDENT IN AN ORGANIZED HEALTH CARE EDUCATION/TRAINING PROGRAM
Payer: COMMERCIAL

## 2025-05-13 VITALS
HEIGHT: 64 IN | OXYGEN SATURATION: 100 % | WEIGHT: 132.81 LBS | HEART RATE: 69 BPM | DIASTOLIC BLOOD PRESSURE: 70 MMHG | RESPIRATION RATE: 20 BRPM | TEMPERATURE: 98 F | SYSTOLIC BLOOD PRESSURE: 122 MMHG | BODY MASS INDEX: 22.67 KG/M2

## 2025-05-13 DIAGNOSIS — G43.001 MIGRAINE WITHOUT AURA AND WITH STATUS MIGRAINOSUS, NOT INTRACTABLE: ICD-10-CM

## 2025-05-13 DIAGNOSIS — Z01.818 PRE-OP EXAM: Primary | ICD-10-CM

## 2025-05-13 DIAGNOSIS — Z01.818 PRE-OP EXAM: ICD-10-CM

## 2025-05-13 DIAGNOSIS — R94.31 T WAVE INVERSION IN EKG: ICD-10-CM

## 2025-05-13 LAB
ALBUMIN SERPL-MCNC: 4.7 G/DL (ref 3.2–4.8)
ALBUMIN/GLOB SERPL: 2 {RATIO} (ref 1–2)
ALP LIVER SERPL-CCNC: 41 U/L (ref 55–142)
ALT SERPL-CCNC: 22 U/L (ref 10–49)
ANION GAP SERPL CALC-SCNC: 9 MMOL/L (ref 0–18)
AST SERPL-CCNC: 23 U/L (ref ?–34)
BASOPHILS # BLD AUTO: 0.02 X10(3) UL (ref 0–0.2)
BASOPHILS NFR BLD AUTO: 0.3 %
BILIRUB SERPL-MCNC: 0.7 MG/DL (ref 0.2–1.1)
BUN BLD-MCNC: 12 MG/DL (ref 9–23)
CALCIUM BLD-MCNC: 9.4 MG/DL (ref 8.7–10.6)
CHLORIDE SERPL-SCNC: 107 MMOL/L (ref 98–112)
CO2 SERPL-SCNC: 25 MMOL/L (ref 21–32)
CREAT BLD-MCNC: 0.67 MG/DL (ref 0.55–1.02)
EGFRCR SERPLBLD CKD-EPI 2021: 96 ML/MIN/1.73M2 (ref 60–?)
EOSINOPHIL # BLD AUTO: 0.15 X10(3) UL (ref 0–0.7)
EOSINOPHIL NFR BLD AUTO: 2.4 %
ERYTHROCYTE [DISTWIDTH] IN BLOOD BY AUTOMATED COUNT: 12.7 %
FASTING STATUS PATIENT QL REPORTED: NO
GLOBULIN PLAS-MCNC: 2.4 G/DL (ref 2–3.5)
GLUCOSE BLD-MCNC: 86 MG/DL (ref 70–99)
HCT VFR BLD AUTO: 37 % (ref 35–48)
HGB BLD-MCNC: 12.3 G/DL (ref 12–16)
IMM GRANULOCYTES # BLD AUTO: 0.01 X10(3) UL (ref 0–1)
IMM GRANULOCYTES NFR BLD: 0.2 %
LYMPHOCYTES # BLD AUTO: 1.26 X10(3) UL (ref 1–4)
LYMPHOCYTES NFR BLD AUTO: 20.2 %
MCH RBC QN AUTO: 29.8 PG (ref 26–34)
MCHC RBC AUTO-ENTMCNC: 33.2 G/DL (ref 31–37)
MCV RBC AUTO: 89.6 FL (ref 80–100)
MONOCYTES # BLD AUTO: 0.47 X10(3) UL (ref 0.1–1)
MONOCYTES NFR BLD AUTO: 7.5 %
NEUTROPHILS # BLD AUTO: 4.34 X10 (3) UL (ref 1.5–7.7)
NEUTROPHILS # BLD AUTO: 4.34 X10(3) UL (ref 1.5–7.7)
NEUTROPHILS NFR BLD AUTO: 69.4 %
OSMOLALITY SERPL CALC.SUM OF ELEC: 291 MOSM/KG (ref 275–295)
PLATELET # BLD AUTO: 223 10(3)UL (ref 150–450)
POTASSIUM SERPL-SCNC: 4.3 MMOL/L (ref 3.5–5.1)
PROT SERPL-MCNC: 7.1 G/DL (ref 5.7–8.2)
RBC # BLD AUTO: 4.13 X10(6)UL (ref 3.8–5.3)
SODIUM SERPL-SCNC: 141 MMOL/L (ref 136–145)
WBC # BLD AUTO: 6.3 X10(3) UL (ref 4–11)

## 2025-05-13 PROCEDURE — 80053 COMPREHEN METABOLIC PANEL: CPT

## 2025-05-13 PROCEDURE — 36415 COLL VENOUS BLD VENIPUNCTURE: CPT

## 2025-05-13 PROCEDURE — 99214 OFFICE O/P EST MOD 30 MIN: CPT | Performed by: STUDENT IN AN ORGANIZED HEALTH CARE EDUCATION/TRAINING PROGRAM

## 2025-05-13 PROCEDURE — 93000 ELECTROCARDIOGRAM COMPLETE: CPT | Performed by: STUDENT IN AN ORGANIZED HEALTH CARE EDUCATION/TRAINING PROGRAM

## 2025-05-13 PROCEDURE — 85025 COMPLETE CBC W/AUTO DIFF WBC: CPT

## 2025-05-13 NOTE — PROGRESS NOTES
PRE-OPERATIVE EVALUATION OFFICE NOTE  Julia Medeiros presents for preoperative evaluation for: Eyelid lift procedure  Performing Physician: Dr. Pineda  Date of surgery: TBD  Elective or emergency: Elective  Pre-operative forms provided: Yes  Anesthesia: General    Patient requires risk assessment by cardiology due to EKG findings of t wave inversion in anterior leads and stress test findings of ST depressions during exercise. She will follow-up with cardiology at this time to better assess her risks. She does not need to make another preop appointment if cardiology work-up is reassuring.     Hx of:   Problem List[1]    //New onset headaches  Chronic headaches every 2 weeks. Motrin, advil, tylenol. No improvement. Tried single to 2 tablets. Temporal and posterior. Last 2 days. Wake up and sleeps with headache. Feels it come on. No visual changes. No nausea, vomiting, dizziness, or lightheadedness. Headaches started in January. Has noticed that it is about the same since January. Not progressively worse. Noise sensitive. Not a lot of stress in her life. No blood pressure issues. Never really had headaches like this. d get headaches time to time like every couple of months. Never lasted this long. Usually related to drinking more alcohol use or not sleeping well. Lasted a few hours and got better with tylenol and ibuprofen.     Reports that pharmacist expressed concern of her being on trazodone and anastrazole.     Denies chest pain, shortness of breath, dizziness, lightheadedness, cough, congestion, fever, chills.     Would like to officially stop lexapro as she is retiring this July.     No other concerns.     Assessment & Plan  1. Pre-op exam (Primary)  -     EKG with interpretation and Report -IN OFFICE [55484]    //New t wave inversions in anterior leads  Denies any chest pain with exertion, decrease in exercise tolerance, shortness of breath. EKG with new t wave inversions. Asymptomatic. Due to all in the  distribution of anterior leads, will obtain stress test. Recommend this testing prior to surgery due to administration of general anesthesia. Will hold on sending final clearance until resulted. Risk factors include: anastrazole and HLD.   -Treadmill stress test ordered.     //New onset non-intractable migraines  2 episodes a month for the last 5 months. On anastrazole. Start sumatriptan 25-50mg PRN.   -MRI brain without contrast ordered. - new onset with hx of breast cancer. R/o stroke, mass, intracranial process.   -Sumatriptan PRN     //L eye lid droop   Neuro exam is benign.   -Refer to oculoplastic - Dr. Tej Pineda.     //Osteopenia   PLAN:   Preventative zometa due to anastrozole. Planning for 2 years of zometa. Will need repeat DEXA scan in 2 years. DEXA 2/2024 - ostepenia in hip and spine  -Daily vitamin D supplemenation 2000 units daily.   -Discussed adequate calcium intake.     //Insomnia   PLAN:   MedHx: lorazepam did not help  -Trazodone 25-50mg nightly PRN.     //Anxiety   PLAN:   -Stop lexapro.     //Infiltrating ductal carcinoma of upper outer quadrant of L breast s/p lumpectomy, SLNBx, and radiation  S/p radiation. On anastrozole. Following with oncology at [facility].   PLAN:   ER+, ND-. HER2+. Forgo adjuvant chemo, completed radiation 2/2024. Now on adjuvant anstrazole 2/2024.   Plan for anastrozole for 5-10 years.   PPX: zometa.  Needs annual diagnostic mmogram   Needs annual pelvic exam - deferred.   Needs to be uptodate on pap smear - deferred.     //Mild mitral regurgitation likely secondary to MVP  Denies chest pain, shortness of breath, dizziness, lightheadedness, leg swelling.   PLAN:   Last echo in 2019 with evidence of mild prolapse involving the anterior leaflet and the posterior leaflet with mild regurgitation.   -Repeat echo ordered.     //Hx of Cervical dysplasia s/p LEEP  PLAN:   Continue pap smear screening due to hx of breast cancer.     Julia Medeiros is a pleasant 65 year  old year old who presents for pre-operative evaluation for above procedure.   Julia Medeiros has greater than 4 mets of activity.   Julia Medeiros has no active cardiac complaints, cardiac history noted as above.   Julia Medeiros has no active pulmonary complaints, pulmonary history noted as above.  Labs and imaging ordered as per pre-op recommendations by surgeon or per up-to-date; please see chart for results.     Patient is at acceptable risk to proceed with surgery at this time without any further testing. Patient is completely asymptomatic at this time.      Follow Up: No follow-ups on file..     Josette Pineda MD  Internal Medicine      Cervical/neck:   - Arthritis: denies  - Neck pain: denies  - Difficulty with ROM: Denies   - Prior neck injury/surgery: Denies     NSAIDS/PLATELET INH: As needed, but knows not to take it prior to surgery   DIABETIC MEDICATIONS: None   MONICA: Denies   Hx of VTE: Denies   BLEEDING DISORDER: Denies   LOOSE DENTITION OR DENTAL APPLIANCES: Denies   URI, COUGH, CP, FEVER: Denies     Cardiac:  - History of ischemic coronary disease: Denies   - History of heart failure: Denies   - Heart attack in past 90 days: Denies   - Chest pain in last 90 days: Deneis   - History of Arrhythmia: Denies   - History of stroke or TIA: Denies   - Diabetes/A1C: Last A1c value was 5.8% done 11/21/2024.  - Pre-op Creatinine > 2: no   - Anticoagulation/Warfarin/ASA/NOAC: denies   - Echo/ Stress test if available: Mild mitral regurg 12/2024     Pulmonary:   - Smoker: Denies   - Apnea/CPAP: Denies   - Asthma/COPD: Denies   - Difficulty laying flat for extended period of time: Denies   - Dyspnea/exertional: Denies   - Respiratory Medications: Denies     Functional Capacity:   Perform ADLs- eating, dress, toilet (1 METs)? Yes, patient can perform.   Walk up flight of steps, hill, walk ground level 3-4mph (4 METs)? Yes, patient can perform.   Perform heavy housework- scrubbing floors, move heavy furniture,  climb 2 flights of stairs (4-10 METs)? Yes   Participate in strenuous sports- swimming, singles tennis, football, basketball, ski (+10 METs)? Yes     Vitals:    05/13/25 1003   BP: 122/70   Pulse: 69   Resp: 20   Temp: 97.8 °F (36.6 °C)   TempSrc: Temporal   SpO2: (!) 20%   Height: 5' 4\" (1.626 m)     Body mass index is 23.17 kg/m².  BP Readings from Last 3 Encounters:   05/13/25 122/70   11/08/24 122/80   01/29/24 120/72     The 10-year ASCVD risk score (Benigno GONZALEZ, et al., 2019) is: 5.9%    Values used to calculate the score:      Age: 65 years      Sex: Female      Is Non- : No      Diabetic: No      Tobacco smoker: No      Systolic Blood Pressure: 122 mmHg      Is BP treated: No      HDL Cholesterol: 68 mg/dL      Total Cholesterol: 199 mg/dL  Objective  Blood pressure 122/70, pulse 69, temperature 97.8 °F (36.6 °C), temperature source Temporal, resp. rate 20, height 5' 4\" (1.626 m), SpO2 (!) 20%.  General: No acute distress. Alert and oriented x 3.  HEENT: Ear canals clear. TMs pearly gray bilaterally.  Respiratory: Clear to auscultation bilaterally.  No wheezes, rales, or rhonchi.   Cardiovascular: RRR. No murmurs, rubs, or gallops.   Abdomen: Soft, nontender, nondistended.  Normoactive bowel sounds. No rebound tenderness  Neurologic: PERRLA. EOM intact. No focal neurological deficits.  Musculoskeletal: Full range of motion of all extremities.  No swelling noted.  Psychiatric: Appropriate mood and affect.    Reviewed Current Medications:  Current Medications[2]    LABS:  Lab Results   Component Value Date    WBC 4.7 11/21/2024    RBC 4.15 11/21/2024    HGB 12.4 11/21/2024    HCT 37.0 11/21/2024    MCV 89.2 11/21/2024    MCH 29.9 11/21/2024    MCHC 33.5 11/21/2024    RDW 12.5 11/21/2024    .0 11/21/2024      Lab Results   Component Value Date    GLU 90 09/15/2023    BUN 15 09/15/2023    BUNCREA 18.2 08/23/2018    CREATSERUM 0.71 09/15/2023    ANIONGAP 8 09/15/2023    GFR 96  2017    GFRNAA 92 2022    GFRAA 106 2022    CA 9.5 09/15/2023    OSMOCALC 290 09/15/2023    ALKPHO 58 09/15/2023    AST 20 09/15/2023    ALT 28 09/15/2023    BILT 0.5 09/15/2023    TP 7.4 09/15/2023    ALB 4.1 09/15/2023    GLOBULIN 3.3 09/15/2023     09/15/2023    K 4.0 09/15/2023     09/15/2023    CO2 26.0 09/15/2023      Lab Results   Component Value Date    CHOLEST 199 2024    TRIG 46 2024    HDL 68 (H) 2024     (H) 2024    VLDL 8 2024    TCHDLRATIO 2.90 2021    NONHDLC 131 (H) 2024      Lab Results   Component Value Date    T4F 0.8 (L) 2014    TSH 3.728 2024      Lab Results   Component Value Date     2024    A1C 5.8 (H) 2024        Reviewed:  Patient Active Problem List    Diagnosis    Special screening for malignant neoplasms, colon    Other hyperlipidemia    Situational anxiety    Insomnia due to other mental disorder    Infiltrating ductal carcinoma of upper-outer quadrant of left breast in female (HCC)    History of loop electrical excision procedure (LEEP)     In her 20s for SANTI      Acquired hallux rigidus of right foot    Other specified congenital anomaly of skin     Log Date: 2012        Varicose veins    MVP (mitral valve prolapse)      Allergies[3]     Short Social Hx on File[4]   Review of Systems  All other systems negative unless otherwise stated in ROS or HPI above.   CARD ECHO 2D DOPPLER (CPT=93306)  Transthoracic Echocardiogram    Name:Julia Medeiros    Date: 2024 :  1960 Ht:  (64in)  BP: 112 / 72  MRN:  [id number]     Age:  64years    Wt:  (134lb) HR: 66bpm  Loc:  [facility]       Gndr: F          BSA: 1.65m^2  Sonographer: KATHERINE Oneil MS    Ordering:    Josette Pineda    Referring:   Josette Pineda    ----------------------------------------------------------------------------  History/Indications:   Mitral  regurgitation.    ----------------------------------------------------------------------------  Procedure information:  A transthoracic complete 2D study was performed.  Additional evaluation included M-mode, complete spectral Doppler, and color  Doppler.  Patient status:  Outpatient.  Location:  Echo laboratory.  Comparison was made to the study of 04/15/2019.    This was a routine study.  Transthoracic echocardiography for ventricular function evaluation and  assessment of valvular function. Image quality was adequate.    ECG rhythm:   Normal sinus  Study completion:  There were no complications.    ----------------------------------------------------------------------------    Conclusions:    1. Left ventricle: The cavity size was normal. Wall thickness was normal.     Systolic function was normal. The estimated ejection fraction was 60-65%,     by visual assessment. No diagnostic evidence for regional wall motion     abnormalities. Left ventricular diastolic function parameters were normal     for the patient's age.  2. Left atrium: The left atrial volume was mildly increased.  3. Mitral valve: Prolapse, involving the anterior leaflet and the posterior     leaflet. There was mild regurgitation.  4. Pulmonary arteries: Systolic pressure was within the normal range,     estimated to be 25mm Hg.  Impressions:  This study is compared with previous dated 4/15/2019: No  significant change since prior study.  *    ----------------------------------------------------------------------------  *  Findings:  Left ventricle:  The cavity size was normal. Wall thickness was normal.  Systolic function was normal. The estimated ejection fraction was 60-65%, by  visual assessment. No diagnostic evidence for regional wall motion  abnormalities. Left ventricular diastolic function parameters were normal  for the patient's age.  Ventricular septum:   Thickness was normal.  Left atrium:  The left atrial volume was mildly  increased.  Right ventricle:  The cavity size was normal. Systolic function was normal.  Right atrium:  The atrium was normal in size.  Mitral valve:   Prolapse, involving the anterior leaflet and the posterior  leaflet.  Doppler:  Transvalvular velocity was within the normal range.  There was no evidence for stenosis. There was mild regurgitation.  Aortic valve:  The valve was structurally normal. The valve was trileaflet.  Cusp separation was normal.  Doppler:  Transvalvular velocity was within the  normal range. There was no evidence for stenosis. There was trivial  regurgitation.  Tricuspid valve:  The valve is structurally normal. Leaflet separation was  normal.  Doppler:  Transvalvular velocity was within the normal range. There  was no evidence for stenosis. There was trivial regurgitation.  Pulmonic valve:   The valve is structurally normal. Cusp separation was  normal.  Doppler:  Transvalvular velocity was within the normal range. There  was no evidence for stenosis. There was trivial regurgitation.  Pericardium:   There was no pericardial effusion.  Aorta:  Aortic root: The aortic root was normal.  Ascending aorta: The ascending aorta was normal.  Pulmonary arteries:  The main pulmonary artery was normal-sized. Systolic pressure was within the  normal range, estimated to be 25mm Hg.  Systemic veins:  Central venous respirophasic diameter changes are in the  normal range (>50%).  Inferior vena cava: The IVC was normal-sized.    ----------------------------------------------------------------------------  Measurements     Left ventricle         Value        Ref       08/23/2018   LV end-diastolic       160   ml     --------- ----------   volume, 3D   LV end-systolic        64    ml     --------- ----------   volume, 3D   LV ejection            60    %      54 - 74   ----------   fraction, 3D   LV end-diastolic   (H) 97    ml/m^2 <=71      ----------   volume/bsa, 3D   LV end-systolic    (H) 39    ml/m^2 <=28       ----------   volume/bsa, 3D   LV wall mass, 3D       159   g      --------- ----------   LV wall mass/bsa,      96    g/m^2  --------- ----------   3D   IVS thickness, ED,     0.8   cm     0.6 - 0.9 0.7   PLAX   LV ID, ED, PLAX        5.0   cm     3.8 - 5.2 5.3   LV ID, ES, PLAX        3.4   cm     2.2 - 3.5 3.4   LV PW thickness,       0.8   cm     0.6 - 0.9 0.7   ED, PLAX   IVS/LV PW ratio,       1.00         --------- ----------   ED, PLAX   LV PW/LV ID ratio,     0.15         --------- ----------   ED, PLAX   LV ejection            60    %      54 - 74   63   fraction   LV e', lateral     (L) 6.5   cm/sec >=10.0    5.4   LV E/e', lateral       10           <=13      ----------   LV e', medial          8.2   cm/sec >=7.0     8.2   LV E/e', medial        8            --------- ----------   LV e', average         7.3   cm/sec --------- ----------   LV E/e', average       8            <=14      ----------     Aortic root            Value        Ref       08/23/2018   Aortic root ID, ED     3.0   cm     2.4 - 3.9 ----------     Ascending aorta        Value        Ref       08/23/2018   Ascending aorta        2.8   cm     1.9 - 3.5 ----------   ID, A-P, ED     Left atrium            Value        Ref       08/23/2018   LA volume, S       (H) 66    ml     22 - 52   90   LA volume/bsa, S   (H) 40    ml/m^2 16 - 34   53   LA volume, ES, 1-p (H) 66    ml     22 - 52   89   A4C   LA volume, ES, 1-p (H) 66    ml     22 - 52   89   A2C   LA volume, ES, A/L     72    ml     --------- ----------   LA volume/bsa, ES, (H) 44    ml/m^2 16 - 34   ----------   A/L   LA volume, ES, 3D  (H) 78    ml     22 - 52   ----------   LA volume/bsa, ES,     47    ml/m^2 --------- ----------   3D     Mitral valve           Value        Ref       08/23/2018   Mitral E-wave peak     0.61  m/sec  --------- 0.64   velocity   Mitral A-wave peak     0.79  m/sec  --------- 0.78   velocity   Mitral E/A ratio,      0.8          ---------  ----------   peak     Pulmonary artery       Value        Ref       08/23/2018   PA pressure, S, DP     25    mm Hg  --------- ----------     Tricuspid valve        Value        Ref       08/23/2018   Tricuspid regurg       2.33  m/sec  <=2.8     ----------   peak velocity   Tricuspid peak         22    mm Hg  --------- ----------   RV-RA gradient     Systemic veins         Value        Ref       08/23/2018   Estimated CVP          3     mm Hg  --------- ----------     Right ventricle        Value        Ref       08/23/2018   TAPSE, 2D              2.30  cm     >=1.70    ----------   RV pressure, S, DP     25    mm Hg  --------- ----------  Legend:  (L)  and  (H)  stoney values outside specified reference range.    ----------------------------------------------------------------------------    Prepared and electronically signed by  Abhilash Mendiola  12/03/2024 07:48      Call office with any questions or seek emergency care if necessary.   Patient understands and agrees to follow directions and advice.    ----------------------------------------- PATIENT INSTRUCTIONS-----------------------------------------     There are no Patient Instructions on file for this visit.             [1]   Patient Active Problem List  Diagnosis    Varicose veins    MVP (mitral valve prolapse)    Other specified congenital anomaly of skin    Acquired hallux rigidus of right foot    History of loop electrical excision procedure (LEEP)    Infiltrating ductal carcinoma of upper-outer quadrant of left breast in female (HCC)    Situational anxiety    Insomnia due to other mental disorder    Other hyperlipidemia    Special screening for malignant neoplasms, colon   [2]   Current Outpatient Medications   Medication Sig Dispense Refill    anastrozole 1 MG Oral Tab tab Take 1 tablet (1 mg total) by mouth daily. After radiation      Cholecalciferol (VITAMIN D) 50 MCG (2000 UT) Oral Cap Take 1 capsule (2,000 Units total) by mouth daily. 2,000 unites  two gummy daily      Multivitamin Chewtab, ADULT, Oral Chew Tab Chew 1 tablet by mouth daily.      TRAZODONE 50 MG Oral Tab TAKE ONE TABLET BY MOUTH AT NIGHT AS NEEDED FOR SLEEP 30 tablet 0    PEG 3350-KCl-Na Bicarb-NaCl 420 g Oral Recon Soln Take as directed by physician (Patient not taking: Reported on 5/13/2025) 4000 mL 0   [3] No Known Allergies  [4]   Social History  Socioeconomic History    Marital status:    Occupational History    Occupation:    Tobacco Use    Smoking status: Never    Smokeless tobacco: Never   Vaping Use    Vaping status: Never Used   Substance and Sexual Activity    Alcohol use: Not Currently     Comment: occasional    Drug use: No   Other Topics Concern    Caffeine Concern Yes     Comment: green tea    Stress Concern No    Weight Concern No    Special Diet No    Exercise Yes    Seat Belt Yes

## 2025-05-14 ENCOUNTER — PATIENT MESSAGE (OUTPATIENT)
Dept: INTERNAL MEDICINE CLINIC | Facility: CLINIC | Age: 65
End: 2025-05-14

## 2025-05-14 RX ORDER — SUMATRIPTAN SUCCINATE 25 MG/1
25 TABLET ORAL DAILY PRN
Qty: 30 TABLET | Refills: 0 | Status: SHIPPED | OUTPATIENT
Start: 2025-05-14

## 2025-05-14 NOTE — TELEPHONE ENCOUNTER
Josette Pineda MD to [facility] Clinical Staff (Selected Message)    5/14/25 12:31 PM  Please send sumatriptan 25mg daily PRN with 30 tablets    Rx sent.

## 2025-05-16 ENCOUNTER — HOSPITAL ENCOUNTER (OUTPATIENT)
Dept: CV DIAGNOSTICS | Facility: HOSPITAL | Age: 65
Discharge: HOME | End: 2025-05-16
Attending: STUDENT IN AN ORGANIZED HEALTH CARE EDUCATION/TRAINING PROGRAM
Payer: COMMERCIAL

## 2025-05-16 DIAGNOSIS — R94.31 T WAVE INVERSION IN EKG: ICD-10-CM

## 2025-05-16 DIAGNOSIS — Z01.818 PRE-OP EXAM: ICD-10-CM

## 2025-05-16 PROCEDURE — 93017 CV STRESS TEST TRACING ONLY: CPT | Performed by: STUDENT IN AN ORGANIZED HEALTH CARE EDUCATION/TRAINING PROGRAM

## 2025-05-16 PROCEDURE — 93018 CV STRESS TEST I&R ONLY: CPT | Performed by: STUDENT IN AN ORGANIZED HEALTH CARE EDUCATION/TRAINING PROGRAM

## 2025-05-23 LAB
ATRIAL RATE: 63 BPM
P AXIS: 19 DEGREES
P-R INTERVAL: 144 MS
Q-T INTERVAL: 396 MS
QRS DURATION: 86 MS
QTC CALCULATION (BEZET): 405 MS
R AXIS: 48 DEGREES
T AXIS: 0 DEGREES
VENTRICULAR RATE: 63 BPM

## 2025-08-26 ENCOUNTER — OFFICE VISIT (OUTPATIENT)
Dept: INTERNAL MEDICINE CLINIC | Facility: CLINIC | Age: 65
End: 2025-08-26

## 2025-08-26 VITALS
HEART RATE: 70 BPM | HEIGHT: 64 IN | TEMPERATURE: 98 F | OXYGEN SATURATION: 98 % | DIASTOLIC BLOOD PRESSURE: 70 MMHG | SYSTOLIC BLOOD PRESSURE: 132 MMHG | BODY MASS INDEX: 22.53 KG/M2 | RESPIRATION RATE: 20 BRPM | WEIGHT: 132 LBS

## 2025-08-26 DIAGNOSIS — Z13.220 SCREENING FOR LIPID DISORDERS: ICD-10-CM

## 2025-08-26 DIAGNOSIS — G43.119 INTRACTABLE MIGRAINE WITH AURA WITHOUT STATUS MIGRAINOSUS: ICD-10-CM

## 2025-08-26 DIAGNOSIS — F99 INSOMNIA DUE TO OTHER MENTAL DISORDER: ICD-10-CM

## 2025-08-26 DIAGNOSIS — Z12.31 ENCOUNTER FOR SCREENING MAMMOGRAM FOR MALIGNANT NEOPLASM OF BREAST: ICD-10-CM

## 2025-08-26 DIAGNOSIS — Z98.890 HISTORY OF LOOP ELECTRICAL EXCISION PROCEDURE (LEEP): ICD-10-CM

## 2025-08-26 DIAGNOSIS — C50.412 INFILTRATING DUCTAL CARCINOMA OF UPPER-OUTER QUADRANT OF LEFT BREAST IN FEMALE (HCC): ICD-10-CM

## 2025-08-26 DIAGNOSIS — Z00.00 ADULT GENERAL MEDICAL EXAM: Primary | ICD-10-CM

## 2025-08-26 DIAGNOSIS — I83.90 VARICOSE VEINS: ICD-10-CM

## 2025-08-26 DIAGNOSIS — F51.01 PRIMARY INSOMNIA: ICD-10-CM

## 2025-08-26 DIAGNOSIS — E78.49 OTHER HYPERLIPIDEMIA: ICD-10-CM

## 2025-08-26 DIAGNOSIS — M85.88 OSTEOPENIA OF LUMBAR SPINE: ICD-10-CM

## 2025-08-26 DIAGNOSIS — F51.05 INSOMNIA DUE TO OTHER MENTAL DISORDER: ICD-10-CM

## 2025-08-26 DIAGNOSIS — Z13.29 SCREENING FOR ENDOCRINE, METABOLIC AND IMMUNITY DISORDER: ICD-10-CM

## 2025-08-26 DIAGNOSIS — Z13.0 SCREENING FOR ENDOCRINE, METABOLIC AND IMMUNITY DISORDER: ICD-10-CM

## 2025-08-26 DIAGNOSIS — Z13.0 SCREENING FOR DEFICIENCY ANEMIA: ICD-10-CM

## 2025-08-26 DIAGNOSIS — I34.1 MVP (MITRAL VALVE PROLAPSE): ICD-10-CM

## 2025-08-26 DIAGNOSIS — G56.03 BILATERAL CARPAL TUNNEL SYNDROME: ICD-10-CM

## 2025-08-26 DIAGNOSIS — F40.243 ANXIETY WITH FLYING: ICD-10-CM

## 2025-08-26 DIAGNOSIS — Z13.228 SCREENING FOR ENDOCRINE, METABOLIC AND IMMUNITY DISORDER: ICD-10-CM

## 2025-08-26 PROBLEM — F41.8 SITUATIONAL ANXIETY: Status: RESOLVED | Noted: 2024-01-16 | Resolved: 2025-08-26

## 2025-08-26 RX ORDER — LORAZEPAM 0.5 MG/1
0.5 TABLET ORAL DAILY PRN
Qty: 10 TABLET | Refills: 0 | Status: SHIPPED | OUTPATIENT
Start: 2025-08-26

## 2025-08-26 RX ORDER — ZOLPIDEM TARTRATE 5 MG/1
5 TABLET ORAL NIGHTLY PRN
Qty: 10 TABLET | Refills: 0 | Status: SHIPPED | OUTPATIENT
Start: 2025-08-26

## 2025-08-26 RX ORDER — SUMATRIPTAN SUCCINATE 25 MG/1
25 TABLET ORAL DAILY PRN
Qty: 30 TABLET | Refills: 2 | Status: SHIPPED | OUTPATIENT
Start: 2025-08-26

## 2025-08-28 ENCOUNTER — TELEPHONE (OUTPATIENT)
Dept: INTERNAL MEDICINE CLINIC | Facility: CLINIC | Age: 65
End: 2025-08-28

## (undated) DEVICE — SUTURE MONOCRYL 3-0 Y936H

## (undated) DEVICE — INTENDED FOR TISSUE SEPARATION, AND OTHER PROCEDURES THAT REQUIRE A SHARP SURGICAL BLADE TO PUNCTURE OR CUT.: Brand: BARD-PARKER ® STAINLESS STEEL BLADES

## (undated) DEVICE — 3M™ STERI-STRIP™ REINFORCED ADHESIVE SKIN CLOSURES, R1547, 1/2 IN X 4 IN (12 MM X 100 MM), 6 STRIPS/ENVELOPE: Brand: 3M™ STERI-STRIP™

## (undated) DEVICE — CRESCENTIC BLADE (15.0 X 0.51 X 29.8MM)

## (undated) DEVICE — STERILE TETRA-FLEX CF, ELASTIC BANDAGE, 2" X 5.5YD: Brand: TETRA-FLEX™CF

## (undated) DEVICE — SUTURE ETHILON 3-0 669H

## (undated) DEVICE — COVER SGL STRL LGHT HNDL BLU

## (undated) DEVICE — ZIMMER® STERILE DISPOSABLE TOURNIQUET CUFF WITH PLC, DUAL PORT, SINGLE BLADDER, 30 IN. (76 CM)

## (undated) DEVICE — SOL  .9 1000ML BTL

## (undated) DEVICE — VIOLET BRAIDED (POLYGLACTIN 910), SYNTHETIC ABSORBABLE SUTURE: Brand: COATED VICRYL

## (undated) DEVICE — STERILE LATEX POWDER-FREE SURGICAL GLOVESWITH NITRILE COATING: Brand: PROTEXIS

## (undated) DEVICE — STERILE POLYISOPRENE POWDER-FREE SURGICAL GLOVES: Brand: PROTEXIS

## (undated) DEVICE — NON-ADHERENT STRIPS,OIL EMULSION: Brand: CURITY

## (undated) DEVICE — SUTURE VICRYL 2-0 FS-1

## (undated) DEVICE — Device

## (undated) DEVICE — UNDYED BRAIDED (POLYGLACTIN 910), SYNTHETIC ABSORBABLE SUTURE: Brand: COATED VICRYL

## (undated) DEVICE — REM POLYHESIVE ADULT PATIENT RETURN ELECTRODE: Brand: VALLEYLAB

## (undated) DEVICE — LOWER EXTREMITY: Brand: MEDLINE INDUSTRIES, INC.

## (undated) DEVICE — 3.0MM DIAMOND ROUND BUR

## (undated) DEVICE — CHLORAPREP 26ML APPLICATOR

## (undated) DEVICE — SUTURE ETHILON 4-0 662G

## (undated) DEVICE — SHOE, POST-OPERATIVE: Brand: DEROYAL

## (undated) NOTE — MR AVS SNAPSHOT
[address] [id number] [phone]               Thank you for choosing us for your health care visit with Ellie Patrick MD.  We are glad to serve you and happy to provid No Known Allergies                Today's Vital Signs     BP Pulse Temp Height Weight BMI    104/66 mmHg 68 97.5 °F (36.4 °C) (Oral) 64.25\" 146 lb 9.6 oz 24.97 kg/m2         Current Medications          This list is accurate as of: 4/25/17  3:25 PM.  Jesenia Madrid

## (undated) NOTE — Clinical Note
May 1, 2017    Thong [address]  [address] [facility],[address]  1/2/1960        Dear Romario Bradley: The following are the results of your recent tests. Please review the list of test results.   Your result is the value on the left; we have also suppli Clinical Information Z12.4 Encounter For Screening For Cervical Cancer .    [FORMATTING REMOVED]    Reason for testing Screening, High Risk    Gyn Additional Information NOTE:  The Pap smear is a screening test that aids in the detection of   cervical cance

## (undated) NOTE — LETTER
Requirements for Pre-Operative Clearance Requests  **All Fields Must Be Completed**    2025    Dear Surgeon,    We have been notified that your patient Julia Medeiros  1960, is scheduled for surgery and that you would like us to clear him/her for this procedure.  In order to comply with governmental coding requirements and in order to bill for our services, the following is required for us to be able to see this patient for pre-operative clearance.     Pre-op appointment is scheduled on 25 with Dr Josette Pineda     Comorbid diagnosis for which clearance is requested:          Surgical Diagnosis:            Procedure:           Surgeon:            Date of surgery:          Length of Surgery:      __________________________________________  Type of anesthesia:     __________________________________________  Location of surgery:           Phone:          Fax:         Completed pre-operative clearance should be faxed to:    Attention:          Phone:         Fax:         Check One:    ¨ Pre-op testing ordered by surgeon  ¨ Pre-op testing to be ordered by pre-admission testing  ¨ No pre-op testing needed    When this form is complete, please fax back to [phone]